# Patient Record
Sex: FEMALE | Race: WHITE | NOT HISPANIC OR LATINO | Employment: OTHER | URBAN - METROPOLITAN AREA
[De-identification: names, ages, dates, MRNs, and addresses within clinical notes are randomized per-mention and may not be internally consistent; named-entity substitution may affect disease eponyms.]

---

## 2017-05-16 ENCOUNTER — OFFICE VISIT (OUTPATIENT)
Dept: GASTROENTEROLOGY | Facility: CLINIC | Age: 57
End: 2017-05-16

## 2017-05-16 VITALS
SYSTOLIC BLOOD PRESSURE: 112 MMHG | HEIGHT: 62 IN | WEIGHT: 225 LBS | DIASTOLIC BLOOD PRESSURE: 76 MMHG | BODY MASS INDEX: 41.41 KG/M2

## 2017-05-16 DIAGNOSIS — K63.5 COLON POLYPS: ICD-10-CM

## 2017-05-16 DIAGNOSIS — E55.9 VITAMIN D DEFICIENCY: ICD-10-CM

## 2017-05-16 DIAGNOSIS — R11.0 NAUSEA: ICD-10-CM

## 2017-05-16 DIAGNOSIS — R19.4 CHANGE IN BOWEL HABITS: ICD-10-CM

## 2017-05-16 DIAGNOSIS — R10.84 GENERALIZED ABDOMINAL PAIN: Primary | ICD-10-CM

## 2017-05-16 PROCEDURE — 99213 OFFICE O/P EST LOW 20 MIN: CPT | Performed by: NURSE PRACTITIONER

## 2017-05-16 RX ORDER — LORAZEPAM 0.5 MG/1
0.5 TABLET ORAL
COMMUNITY
End: 2020-03-02 | Stop reason: ALTCHOICE

## 2017-05-16 RX ORDER — ERGOCALCIFEROL 1.25 MG/1
CAPSULE ORAL
Refills: 0 | COMMUNITY
Start: 2017-04-24 | End: 2017-07-10

## 2017-05-16 RX ORDER — SUCRALFATE 1 G/1
1 TABLET ORAL 4 TIMES DAILY
Qty: 120 TABLET | Refills: 1 | Status: SHIPPED | OUTPATIENT
Start: 2017-05-16 | End: 2017-07-10

## 2017-05-16 RX ORDER — LOSARTAN POTASSIUM 25 MG/1
TABLET ORAL
Refills: 3 | COMMUNITY
Start: 2017-05-02 | End: 2017-07-10

## 2017-05-16 RX ORDER — TRAZODONE HYDROCHLORIDE 50 MG/1
25 TABLET ORAL NIGHTLY
COMMUNITY
End: 2020-03-02 | Stop reason: ALTCHOICE

## 2017-05-16 RX ORDER — SERTRALINE HYDROCHLORIDE 25 MG/1
25 TABLET, FILM COATED ORAL NIGHTLY
COMMUNITY
End: 2019-06-07

## 2017-05-16 RX ORDER — METOPROLOL SUCCINATE 25 MG/1
TABLET, EXTENDED RELEASE ORAL
Refills: 1 | COMMUNITY
Start: 2017-05-02 | End: 2017-07-10

## 2017-05-16 RX ORDER — SODIUM CHLORIDE 0.9 % (FLUSH) 0.9 %
1-10 SYRINGE (ML) INJECTION AS NEEDED
Status: CANCELLED | OUTPATIENT
Start: 2017-05-16

## 2017-05-17 ENCOUNTER — TELEPHONE (OUTPATIENT)
Dept: GASTROENTEROLOGY | Facility: CLINIC | Age: 57
End: 2017-05-17

## 2017-05-17 LAB
25(OH)D3+25(OH)D2 SERPL-MCNC: 18.9 NG/ML (ref 30–100)
ALBUMIN SERPL-MCNC: 4.2 G/DL (ref 3.5–5.2)
ALBUMIN/GLOB SERPL: 1.4 G/DL
ALP SERPL-CCNC: 69 U/L (ref 39–117)
ALT SERPL-CCNC: 28 U/L (ref 1–33)
AMYLASE SERPL-CCNC: 49 U/L (ref 28–100)
AST SERPL-CCNC: 24 U/L (ref 1–32)
BASOPHILS # BLD AUTO: 0.02 10*3/MM3 (ref 0–0.2)
BASOPHILS NFR BLD AUTO: 0.2 % (ref 0–1.5)
BILIRUB SERPL-MCNC: 0.2 MG/DL (ref 0.1–1.2)
BUN SERPL-MCNC: 17 MG/DL (ref 6–20)
BUN/CREAT SERPL: 21.3 (ref 7–25)
CALCIUM SERPL-MCNC: 9.6 MG/DL (ref 8.6–10.5)
CHLORIDE SERPL-SCNC: 101 MMOL/L (ref 98–107)
CO2 SERPL-SCNC: 30.1 MMOL/L (ref 22–29)
CREAT SERPL-MCNC: 0.8 MG/DL (ref 0.57–1)
EOSINOPHIL # BLD AUTO: 0.3 10*3/MM3 (ref 0–0.7)
EOSINOPHIL NFR BLD AUTO: 3.7 % (ref 0.3–6.2)
ERYTHROCYTE [DISTWIDTH] IN BLOOD BY AUTOMATED COUNT: 15.9 % (ref 11.7–13)
GLOBULIN SER CALC-MCNC: 3 GM/DL
GLUCOSE SERPL-MCNC: 94 MG/DL (ref 65–99)
HCT VFR BLD AUTO: 37.6 % (ref 35.6–45.5)
HGB BLD-MCNC: 11.9 G/DL (ref 11.9–15.5)
IMM GRANULOCYTES # BLD: 0.02 10*3/MM3 (ref 0–0.03)
IMM GRANULOCYTES NFR BLD: 0.2 % (ref 0–0.5)
LIPASE SERPL-CCNC: 70 U/L (ref 13–60)
LYMPHOCYTES # BLD AUTO: 3.24 10*3/MM3 (ref 0.9–4.8)
LYMPHOCYTES NFR BLD AUTO: 40 % (ref 19.6–45.3)
MCH RBC QN AUTO: 30.1 PG (ref 26.9–32)
MCHC RBC AUTO-ENTMCNC: 31.6 G/DL (ref 32.4–36.3)
MCV RBC AUTO: 95.2 FL (ref 80.5–98.2)
MONOCYTES # BLD AUTO: 0.89 10*3/MM3 (ref 0.2–1.2)
MONOCYTES NFR BLD AUTO: 11 % (ref 5–12)
NEUTROPHILS # BLD AUTO: 3.63 10*3/MM3 (ref 1.9–8.1)
NEUTROPHILS NFR BLD AUTO: 44.9 % (ref 42.7–76)
PLATELET # BLD AUTO: 324 10*3/MM3 (ref 140–500)
POTASSIUM SERPL-SCNC: 4.5 MMOL/L (ref 3.5–5.2)
PROT SERPL-MCNC: 7.2 G/DL (ref 6–8.5)
RBC # BLD AUTO: 3.95 10*6/MM3 (ref 3.9–5.2)
SODIUM SERPL-SCNC: 142 MMOL/L (ref 136–145)
TSH SERPL DL<=0.005 MIU/L-ACNC: 2.65 MIU/ML (ref 0.27–4.2)
WBC # BLD AUTO: 8.1 10*3/MM3 (ref 4.5–10.7)

## 2017-05-25 ENCOUNTER — APPOINTMENT (OUTPATIENT)
Dept: CT IMAGING | Facility: HOSPITAL | Age: 57
End: 2017-05-25

## 2017-05-30 ENCOUNTER — APPOINTMENT (OUTPATIENT)
Dept: CT IMAGING | Facility: HOSPITAL | Age: 57
End: 2017-05-30

## 2017-07-10 RX ORDER — ERGOCALCIFEROL 1.25 MG/1
50000 CAPSULE ORAL
COMMUNITY

## 2017-07-10 RX ORDER — LOSARTAN POTASSIUM 25 MG/1
25 TABLET ORAL NIGHTLY
COMMUNITY

## 2017-07-11 ENCOUNTER — ANESTHESIA EVENT (OUTPATIENT)
Dept: GASTROENTEROLOGY | Facility: HOSPITAL | Age: 57
End: 2017-07-11

## 2017-07-11 ENCOUNTER — ANESTHESIA (OUTPATIENT)
Dept: GASTROENTEROLOGY | Facility: HOSPITAL | Age: 57
End: 2017-07-11

## 2017-07-11 ENCOUNTER — HOSPITAL ENCOUNTER (OUTPATIENT)
Facility: HOSPITAL | Age: 57
Setting detail: HOSPITAL OUTPATIENT SURGERY
Discharge: HOME | End: 2017-07-11
Attending: INTERNAL MEDICINE | Admitting: INTERNAL MEDICINE

## 2017-07-11 VITALS
DIASTOLIC BLOOD PRESSURE: 70 MMHG | SYSTOLIC BLOOD PRESSURE: 133 MMHG | BODY MASS INDEX: 40.72 KG/M2 | RESPIRATION RATE: 14 BRPM | HEIGHT: 62 IN | WEIGHT: 221.3 LBS | TEMPERATURE: 97.7 F | HEART RATE: 67 BPM | OXYGEN SATURATION: 97 %

## 2017-07-11 DIAGNOSIS — R11.0 NAUSEA: ICD-10-CM

## 2017-07-11 DIAGNOSIS — R19.4 CHANGE IN BOWEL HABITS: ICD-10-CM

## 2017-07-11 DIAGNOSIS — R10.84 GENERALIZED ABDOMINAL PAIN: ICD-10-CM

## 2017-07-11 LAB — GLUCOSE BLDC GLUCOMTR-MCNC: 83 MG/DL (ref 70–130)

## 2017-07-11 PROCEDURE — 88341 IMHCHEM/IMCYTCHM EA ADD ANTB: CPT

## 2017-07-11 PROCEDURE — 45385 COLONOSCOPY W/LESION REMOVAL: CPT | Performed by: INTERNAL MEDICINE

## 2017-07-11 PROCEDURE — 43239 EGD BIOPSY SINGLE/MULTIPLE: CPT | Performed by: INTERNAL MEDICINE

## 2017-07-11 PROCEDURE — 25010000002 PROPOFOL 10 MG/ML EMULSION: Performed by: NURSE ANESTHETIST, CERTIFIED REGISTERED

## 2017-07-11 PROCEDURE — 88342 IMHCHEM/IMCYTCHM 1ST ANTB: CPT

## 2017-07-11 PROCEDURE — 82962 GLUCOSE BLOOD TEST: CPT

## 2017-07-11 PROCEDURE — 45380 COLONOSCOPY AND BIOPSY: CPT | Performed by: INTERNAL MEDICINE

## 2017-07-11 PROCEDURE — 45381 COLONOSCOPY SUBMUCOUS NJX: CPT | Performed by: INTERNAL MEDICINE

## 2017-07-11 PROCEDURE — 88305 TISSUE EXAM BY PATHOLOGIST: CPT | Performed by: INTERNAL MEDICINE

## 2017-07-11 PROCEDURE — 88312 SPECIAL STAINS GROUP 1: CPT | Performed by: INTERNAL MEDICINE

## 2017-07-11 RX ORDER — PROMETHAZINE HYDROCHLORIDE 25 MG/1
25 TABLET ORAL ONCE AS NEEDED
Status: DISCONTINUED | OUTPATIENT
Start: 2017-07-11 | End: 2017-07-11 | Stop reason: HOSPADM

## 2017-07-11 RX ORDER — PROMETHAZINE HYDROCHLORIDE 25 MG/ML
12.5 INJECTION, SOLUTION INTRAMUSCULAR; INTRAVENOUS ONCE AS NEEDED
Status: DISCONTINUED | OUTPATIENT
Start: 2017-07-11 | End: 2017-07-11 | Stop reason: HOSPADM

## 2017-07-11 RX ORDER — SODIUM CHLORIDE 0.9 % (FLUSH) 0.9 %
1-10 SYRINGE (ML) INJECTION AS NEEDED
Status: DISCONTINUED | OUTPATIENT
Start: 2017-07-11 | End: 2017-07-11 | Stop reason: HOSPADM

## 2017-07-11 RX ORDER — PROMETHAZINE HYDROCHLORIDE 25 MG/1
25 SUPPOSITORY RECTAL ONCE AS NEEDED
Status: DISCONTINUED | OUTPATIENT
Start: 2017-07-11 | End: 2017-07-11 | Stop reason: HOSPADM

## 2017-07-11 RX ORDER — SODIUM CHLORIDE, SODIUM LACTATE, POTASSIUM CHLORIDE, CALCIUM CHLORIDE 600; 310; 30; 20 MG/100ML; MG/100ML; MG/100ML; MG/100ML
30 INJECTION, SOLUTION INTRAVENOUS CONTINUOUS PRN
Status: DISCONTINUED | OUTPATIENT
Start: 2017-07-11 | End: 2017-07-11 | Stop reason: HOSPADM

## 2017-07-11 RX ORDER — PROPOFOL 10 MG/ML
VIAL (ML) INTRAVENOUS CONTINUOUS PRN
Status: DISCONTINUED | OUTPATIENT
Start: 2017-07-11 | End: 2017-07-11 | Stop reason: SURG

## 2017-07-11 RX ORDER — PROPOFOL 10 MG/ML
VIAL (ML) INTRAVENOUS AS NEEDED
Status: DISCONTINUED | OUTPATIENT
Start: 2017-07-11 | End: 2017-07-11 | Stop reason: SURG

## 2017-07-11 RX ORDER — FLUTICASONE PROPIONATE 50 MCG
2 SPRAY, SUSPENSION (ML) NASAL DAILY
COMMUNITY

## 2017-07-11 RX ADMIN — PROPOFOL 200 MCG/KG/MIN: 10 INJECTION, EMULSION INTRAVENOUS at 14:27

## 2017-07-11 RX ADMIN — PROPOFOL 100 MG: 10 INJECTION, EMULSION INTRAVENOUS at 14:29

## 2017-07-11 RX ADMIN — SODIUM CHLORIDE, POTASSIUM CHLORIDE, SODIUM LACTATE AND CALCIUM CHLORIDE 30 ML/HR: 600; 310; 30; 20 INJECTION, SOLUTION INTRAVENOUS at 14:01

## 2017-07-11 NOTE — PLAN OF CARE
Problem: Patient Care Overview (Adult)  Goal: Plan of Care Review  Outcome: Ongoing (interventions implemented as appropriate)    07/11/17 1323   Coping/Psychosocial Response Interventions   Plan Of Care Reviewed With patient       Goal: Adult Individualization and Mutuality  Outcome: Ongoing (interventions implemented as appropriate)  Goal: Discharge Needs Assessment  Outcome: Ongoing (interventions implemented as appropriate)    Problem: GI Endoscopy (Adult)  Intervention: Monitor/Manage Procedure Recovery    07/11/17 1323   Respiratory Interventions   Airway/Ventilation Management airway patency maintained   Coping/Psychosocial Interventions   Environmental Support calm environment promoted   Activity   Activity Type activity adjusted per tolerance   Cardiac Interventions   Warming Thermoregulation Maintenance warm blankets applied       Intervention: Prevent Karin-procedural Injury    07/11/17 1323   Positioning   Positioning side lying, left   Head Of Bed (HOB) Position HOB elevated

## 2017-07-11 NOTE — ANESTHESIA PREPROCEDURE EVALUATION
Anesthesia Evaluation     Patient summary reviewed   NPO Solid Status: > 8 hours  NPO Liquid Status: > 6 hours     Airway   Mallampati: II  TM distance: >3 FB  Dental      Pulmonary    Cardiovascular     Rhythm: regular  Rate: normal    (+) hypertension,       Neuro/Psych  GI/Hepatic/Renal/Endo    (+) morbid obesity, GERD, diabetes mellitus,     Musculoskeletal     (+) myalgias,   Abdominal    Substance History      OB/GYN          Other      history of cancer remission                                    Anesthesia Plan    ASA 2     MAC     Anesthetic plan and risks discussed with patient.

## 2017-07-11 NOTE — H&P
Chief Complaint   Patient presents with   • Abdominal Pain   • Heartburn       and sore tongue         Sri Kellogg is a 56 y.o. female here for a follow up visit for Nausea, abdominal pain, changes in bowel habits      HPI     Patient of Dr. Betnley's seen for her first and only visit in our office in January 2016. At that time presented with complaints of upper abdominal discomfort that is been ongoing for several years, worse after eating. At that time complaining of intermittent nausea with no vomiting. Diarrhea averaging 6 bowel movements a day without rectal bleeding or melena or reports of weight loss. Labs were obtained, CT of the abdomen and pelvis was planned but not done, stool studies were not done. Labs were reviewed with normal findings. The patient was lost to follow-up until today.     Patient presents today complaining of ongoing chronic nausea that is intermittent but more persistent for the last 6 months to a year. This is not associated with any vomiting. She does not report that it is related to food intake. She can identify no specific triggers with regard to onset or worsening specifically changes and dietary or medication regimen. She does have a history of diabetes but reports it is fairly well controlled. She has had no formal evaluation with regard to endoscopy or gastric emptying studies. She takes no medications for the symptoms. In addition, she complains of intermittent burning in her throat that again is been worse over the last 6 months. She does notice that the burning is worse approximately 2-3 hours after food intake but the type of food does not worsen the severity of the symptoms. She also reports refluxing of food and to the back of her mouth when sleeping. She reports that she will often wake up choking or vomiting and that has been ongoing for a year. Previous medication trials have included Nexium, Prilosec and Prevacid over-the-counter that caused leg swelling. She has tried  Zantac and Pepcid previously that cause heart racing. She does admit eating late at night prior to going to bed. She does use a adjustable bed in which the head of her bed can be elevated but is noticed no change with bed adjustments     . The diarrhea that she was experiencing changed to primarily constipation since September 2016. She reports having a bowel movement every other day sometimes going every 3-4 days. If she goes at least 3-4 day she will use MiraLAX with fairly good results. This is associated with abdominal distention and primarily left-sided abdominal discomfort. She does report that the nausea gets worse when she is constipated. She has had prior colonoscopies that she reports were done approximately 3-4 years ago with findings of colon polyps that were benign to the best of her recollection. She did denies any family history of colon cancers or irritable bowel disease. Denies any blood in her stools.      Medical History         Past Medical History:   Diagnosis Date   • Cancer       skin   • Colon polyp     • Diabetes mellitus     • GERD (gastroesophageal reflux disease)     • Hypertension               Current Medications           Current Outpatient Prescriptions   Medication Sig Dispense Refill   • LORazepam (ATIVAN) 0.5 MG tablet Take 0.5 mg by mouth Every 8 (Eight) Hours As Needed for Anxiety.       • losartan (COZAAR) 25 MG tablet TK 1 T PO QD   3   • metFORMIN (GLUCOPHAGE) 500 MG tablet Take 500 mg by mouth 2 (Two) Times a Day With Meals.       • metoprolol succinate XL (TOPROL-XL) 25 MG 24 hr tablet TK 1 T PO QD   1   • sertraline (ZOLOFT) 25 MG tablet Take 25 mg by mouth Daily.       • traZODone (DESYREL) 25 MG half tablet Take 25 mg by mouth Every Night.       • vitamin D (ERGOCALCIFEROL) 01170 UNITS capsule capsule TK ONE C PO Q 2 WEEKS   0   • sucralfate (CARAFATE) 1 G tablet Take 1 tablet by mouth 4 (Four) Times a Day. 120 tablet 1      No current facility-administered medications  "for this visit.             PRN Meds:.          Allergies   Allergen Reactions   • Lisinopril Cough   • Penicillins     • Codeine Palpitations and GI Intolerance          Social History    Social History            Social History   • Marital status: Single       Spouse name: N/A   • Number of children: N/A   • Years of education: N/A          Occupational History   • Not on file.           Social History Main Topics   • Smoking status: Former Smoker   • Smokeless tobacco: Not on file   • Alcohol use No   • Drug use: No   • Sexual activity: Not on file           Other Topics Concern   • Not on file      Social History Narrative                  Family History   Problem Relation Age of Onset   • TRAE disease Mother     • Colon polyps Mother           Review of Systems   Constitutional: Negative for activity change, appetite change and unexpected weight change.   HENT: Negative for trouble swallowing.   Respiratory: Positive for choking.   Cardiovascular: Negative for chest pain.   Gastrointestinal: Positive for abdominal pain, constipation, nausea and vomiting. Negative for abdominal distention and blood in stool.   Allergic/Immunologic: Negative for immunocompromised state.             Vitals:     05/16/17 1438   BP: 112/76      /76  Ht 62\" (157.5 cm)  Wt 225 lb (102 kg)  BMI 41.15 kg/m2  Physical Exam   Constitutional: She is oriented to person, place, and time. She appears well-developed and well-nourished.   Obese   HENT:   Head: Normocephalic and atraumatic.   Mouth/Throat: Oropharynx is clear and moist.   Eyes: No scleral icterus.   Neck: No thyromegaly present.   Cardiovascular: Normal rate and regular rhythm.   Pulmonary/Chest: Effort normal and breath sounds normal.   Abdominal: Soft. Bowel sounds are normal. She exhibits no distension. There is no tenderness. There is no rebound and no guarding.   Obese, round, no pain to palpation   Neurological: She is alert and oriented to person, place, and time. "   Skin: Skin is warm and dry.   Psychiatric: She has a normal mood and affect.   Vitals reviewed.        ASSESSMENT AND PLAN     Sri was seen today for abdominal pain and heartburn.     Diagnoses and all orders for this visit:     Generalized abdominal pain  Comments:  Obtain CT with pancreatic protocol labs today  Orders:  - CT Abdomen Pelvis With Contrast; Future  - CBC & Differential  - Comprehensive Metabolic Panel  - Amylase  - Lipase  - TSH  - Case Request; Standing  - sodium chloride 0.9 % flush 1-10 mL; Infuse 1-10 mL into a venous catheter As Needed for Line Care.  - Case Request     Change in bowel habits  Comments:  And colonoscopy for further evaluation  Orders:  - TSH  - Case Request; Standing  - sodium chloride 0.9 % flush 1-10 mL; Infuse 1-10 mL into a venous catheter As Needed for Line Care.  - Case Request     Nausea  Comments:  In EGD for further evaluation, start Carafate  Orders:  - Vitamin D 25 Hydroxy  - TSH  - Case Request; Standing  - sodium chloride 0.9 % flush 1-10 mL; Infuse 1-10 mL into a venous catheter As Needed for Line Care.  - Case Request     Vitamin D deficiency   - Vitamin D 25 Hydroxy     Colon polyps  Comments:  Last colonoscopy 3-4 years ago. Subjective history of polyps with benign pathology. Follow-up colonoscopy be scheduled     Other orders  - Obtain informed consent; Standing  - Verify bowel prep was successful; Standing  - Give tap water enema if bowel prep was insufficient; Standing  - Give Fleet's enema for intolerance of bowel prep; Standing  - Insert Peripheral IV; Standing  - Saline Lock & Maintain IV Access; Standing  - Verify Informed Consent; Standing  - sucralfate (CARAFATE) 1 G tablet; Take 1 tablet by mouth 4 (Four) Times a Day.        Labs will be obtained as above and patient will be notified of test results. In addition, CT of the abdomen and pelvis with pancreatic protocol for upper abdominal discomfort will be performed and patient will be notified of  test results. We will schedule her for both EGD and colonoscopy for evaluation of now fairly chronic nausea, burning in her throat, reflux and alterations in bowel habits with constipation. She can continue MiraLAX over-the-counter as needed. Start Carafate for complaints of nausea and burning in her throat. Previous trials of PPIs to include Nexium, Prilosec and Prevacid have caused leg swelling. She had reactions with Zantac and Pepcid with complaints of heart racing. Further treatment plan pending lab review, imaging and endoscopy findings.     7/11/17 - No change from the above H and P. Saud Bentley M.D.

## 2017-07-11 NOTE — DISCHARGE INSTRUCTIONS
For the next 24 hours patient needs to be with a responsible adult.    For 24 hours DO NOT drive, operate machinery, appliances, drink alcohol, make important decisions or sign legal documents.    Start with a light or bland diet and advance to regular diet as tolerated.    Follow recommendations on procedure report provided by your doctor.    Call Dr. Bentley for problems [phone]    Problems may include but not limited to: large amounts of bleeding, trouble breathing, repeated vomiting, severe unrelieved pain, fever or chills.

## 2017-07-11 NOTE — ANESTHESIA POSTPROCEDURE EVALUATION
Patient: Sri Valdez    Procedure Summary     Date Anesthesia Start Anesthesia Stop Room / Location    07/11/17 1419 1519  [facility]       Procedure Diagnosis Surgeon Provider    ESOPHAGOGASTRODUODENOSCOPY with biopsies (N/A Esophagus); COLONOSCOPY to cecum with random biopsies, and hot snare and cold polypectomies (N/A ) Nausea; Generalized abdominal pain; Change in bowel habits  (Nausea [R11.0]; Generalized abdominal pain [R10.84]; Change in bowel habits [R19.4]) MD Katie Watkins MD          Anesthesia Type: MAC  Last vitals  /74 (07/11/17 1518)    Temp      Pulse 96 (07/11/17 1518)   Resp 16 (07/11/17 1518)    SpO2 93 % (07/11/17 1518)      Post Anesthesia Care and Evaluation    Patient location during evaluation: PACU  Patient participation: complete - patient participated  Level of consciousness: awake and alert  Pain management: adequate  Airway patency: patent  Anesthetic complications: No anesthetic complications    Cardiovascular status: acceptable  Respiratory status: acceptable  Hydration status: acceptable

## 2017-07-18 LAB
CYTO UR: NORMAL
DX PRELIMINARY: NORMAL
LAB AP CASE REPORT: NORMAL
LAB AP INTRADEPARTMENTAL CONSULT: NORMAL
Lab: NORMAL
PATH REPORT.ADDENDUM SPEC: NORMAL
PATH REPORT.FINAL DX SPEC: NORMAL
PATH REPORT.GROSS SPEC: NORMAL

## 2017-07-18 NOTE — PROGRESS NOTES
Tell her that path from the EGD looked good. The colon polyps that were removed were not cancerous but some were precancerous. I recommend a repeat c/s in 3-5 yrs. ceferino

## 2017-07-24 ENCOUNTER — TELEPHONE (OUTPATIENT)
Dept: GASTROENTEROLOGY | Facility: CLINIC | Age: 57
End: 2017-07-24

## 2017-07-24 ENCOUNTER — OFFICE VISIT (OUTPATIENT)
Dept: GASTROENTEROLOGY | Facility: CLINIC | Age: 57
End: 2017-07-24

## 2017-07-24 ENCOUNTER — HOSPITAL ENCOUNTER (OUTPATIENT)
Dept: CT IMAGING | Facility: HOSPITAL | Age: 57
Discharge: HOME | End: 2017-07-24
Attending: INTERNAL MEDICINE | Admitting: INTERNAL MEDICINE

## 2017-07-24 VITALS
HEIGHT: 62 IN | SYSTOLIC BLOOD PRESSURE: 116 MMHG | BODY MASS INDEX: 41.22 KG/M2 | DIASTOLIC BLOOD PRESSURE: 70 MMHG | TEMPERATURE: 98.1 F | WEIGHT: 224 LBS

## 2017-07-24 DIAGNOSIS — K63.5 COLON POLYPS: ICD-10-CM

## 2017-07-24 DIAGNOSIS — R10.11 RIGHT UPPER QUADRANT ABDOMINAL PAIN: Primary | ICD-10-CM

## 2017-07-24 PROCEDURE — 74177 CT ABD & PELVIS W/CONTRAST: CPT

## 2017-07-24 PROCEDURE — 82565 ASSAY OF CREATININE: CPT

## 2017-07-24 PROCEDURE — 0 IOPAMIDOL 61 % SOLUTION: Performed by: INTERNAL MEDICINE

## 2017-07-24 PROCEDURE — 0 DIATRIZOATE MEGLUMINE & SODIUM PER 1 ML: Performed by: INTERNAL MEDICINE

## 2017-07-24 PROCEDURE — 99213 OFFICE O/P EST LOW 20 MIN: CPT | Performed by: INTERNAL MEDICINE

## 2017-07-24 RX ORDER — PANTOPRAZOLE SODIUM 40 MG/1
TABLET, DELAYED RELEASE ORAL AS NEEDED
Refills: 11 | COMMUNITY
Start: 2017-07-13 | End: 2022-03-03 | Stop reason: ALTCHOICE

## 2017-07-24 RX ADMIN — DIATRIZOATE MEGLUMINE AND DIATRIZOATE SODIUM 30 ML: 660; 100 LIQUID ORAL; RECTAL at 11:50

## 2017-07-24 RX ADMIN — IOPAMIDOL 85 ML: 612 INJECTION, SOLUTION INTRAVENOUS at 13:21

## 2017-07-24 NOTE — TELEPHONE ENCOUNTER
Received from call from Dr Barahona that the pt's states ct of abd and pelvis showed no free air or abcess.  He states it does show a fatty liver.  He states [facility] is holding the pt until they hear back from us.  Advised him we would call them and tell them the pt can be dc'd.      Called [facility] at [phone] and spoke with Darshana who advised she had dc'd the pt.  Advised would update Dr Bentley.

## 2017-07-24 NOTE — PROGRESS NOTES
"Chief Complaint   Patient presents with   • Follow-up     [place]       History of Present Illness: We reviewed the results of the EGD and colonoscopy done earlier this month. She has not had the CT abd/pelvis because she is raising her neice's daughter and she is having surgery for a Chiari malformation @ 5 yo. Every time she takes a PPI her heart feels like it \"is rolling\". She feels like gas is refluxing into the esophagus and it is stuck. She feels discomfort in the right flank and it is worse after taking Miralax. She took some Ceftin that she had and it helped a little. She still has this right flank pain that is worse with walking or when on her feet. +nausea but no vomiting. NO fevers, chills. +dysuria. No urinary frequency.     Past Medical History:   Diagnosis Date   • Anxiety and depression    • Cancer     skin   • Colon polyp    • Diabetes mellitus    • Dry skin    • Fibromyalgia    • GERD (gastroesophageal reflux disease)    • History of lupus    • Hypertension    • PVC's (premature ventricular contractions)    • SOB (shortness of breath)        Past Surgical History:   Procedure Laterality Date   • BREAST LUMPECTOMY     • CHOLECYSTECTOMY     • COLONOSCOPY  2014   • COLONOSCOPY N/A 7/11/2017    Procedure: COLONOSCOPY to cecum with random biopsies, and hot snare and cold polypectomies;  Surgeon: Saud Bentley MD;  Location: [facility] END[facility]COPY;  Service:    • ENDOSCOPY N/A 7/11/2017    Procedure: ESOPHAGOGASTRODUODENOSCOPY with biopsies;  Surgeon: Saud Bentley MD;  Location: [facility] ENDOSCOPY;  Service:    • HYSTERECTOMY     • SINUS SURGERY     • UPPER GASTROINTESTINAL ENDOSCOPY  07/11/2017    LA Grade A reflux esophagitis, Erythematous mucosa in the stomach, Multiple gastric polyps         Current Outpatient Prescriptions:   •  fluticasone (FLONASE) 50 MCG/ACT nasal spray, 2 sprays into each nostril Daily., Disp: , Rfl:   •  LORazepam (ATIVAN) 0.5 MG tablet, Take 0.5 mg by mouth Every 8 (Eight) Hours As " Needed for Anxiety., Disp: , Rfl:   •  losartan (COZAAR) 25 MG tablet, Take 25 mg by mouth Every Night., Disp: , Rfl:   •  metFORMIN (GLUCOPHAGE) 500 MG tablet, Take 500 mg by mouth 2 (Two) Times a Day With Meals., Disp: , Rfl:   •  metoprolol tartrate (LOPRESSOR) 25 MG tablet, Take 25 mg by mouth Every Night., Disp: , Rfl:   •  pantoprazole (PROTONIX) 40 MG EC tablet, TK 1 T PO QD, Disp: , Rfl: 11  •  sertraline (ZOLOFT) 25 MG tablet, Take 25 mg by mouth Every Night., Disp: , Rfl:   •  traZODone (DESYREL) 25 MG half tablet, Take 25 mg by mouth Every Night., Disp: , Rfl:   •  vitamin D (ERGOCALCIFEROL) 97801 UNITS capsule capsule, Take 50,000 Units by mouth Every 14 (Fourteen) Days., Disp: , Rfl:     Allergies   Allergen Reactions   • Codeine Palpitations, GI Intolerance and Shortness Of Breath   • Penicillins Arrhythmia and Shortness Of Breath     RAPID HEARTRATE   • Demerol [Meperidine] Hives   • Dust Mite Extract Other (See Comments)     SNEEZING, SOA   • Levofloxacin Other (See Comments)     RIGID JAW   • Lisinopril Cough   • Meperidine Hcl Hives   • Adhesive Tape Rash       Family History   Problem Relation Age of Onset   • TRAE disease Mother    • Colon polyps Mother    • Malig Hyperthermia Neg Hx        Social History     Social History   • Marital status: Single     Spouse name: N/A   • Number of children: N/A   • Years of education: N/A     Occupational History   • Not on file.     Social History Main Topics   • Smoking status: Former Smoker     Packs/day: 2.00     Types: Cigarettes   • Smokeless tobacco: Never Used      Comment: JIMMY 20 YRS AGO   • Alcohol use No   • Drug use: No   • Sexual activity: Defer     Other Topics Concern   • Not on file     Social History Narrative       Review of Systems   Gastrointestinal: Positive for abdominal pain.   All other systems reviewed and are negative.      Vitals:    07/24/17 0958   BP: 116/70   Temp: 98.1 °F (36.7 °C)       Physical Exam   Constitutional: She is  oriented to person, place, and time. She appears well-developed and well-nourished. No distress.   HENT:   Head: Normocephalic and atraumatic. Hair is normal.   Right Ear: Hearing, tympanic membrane, external ear and ear canal normal. No drainage. No decreased hearing is noted.   Left Ear: Hearing, tympanic membrane, external ear and ear canal normal. No decreased hearing is noted.   Nose: No nasal deformity.   Mouth/Throat: Oropharynx is clear and moist.   Eyes: Conjunctivae, EOM and lids are normal. Pupils are equal, round, and reactive to light. Right eye exhibits no discharge. Left eye exhibits no discharge.   Neck: Normal range of motion. Neck supple. No JVD present. No tracheal deviation present. No thyromegaly present.   Cardiovascular: Normal rate, regular rhythm, normal heart sounds, intact distal pulses and normal pulses.  Exam reveals no gallop and no friction rub.    No murmur heard.  Pulmonary/Chest: Effort normal and breath sounds normal. No respiratory distress. She has no wheezes. She has no rales. She exhibits no tenderness.   Abdominal: Soft. Bowel sounds are normal. She exhibits no distension and no mass. There is tenderness. There is no rebound and no guarding. No hernia.   MIld RUQ tenderness. The skin looks normal in that area.    Musculoskeletal: Normal range of motion. She exhibits no edema, tenderness or deformity.   Lymphadenopathy:     She has no cervical adenopathy.   Neurological: She is alert and oriented to person, place, and time. She has normal reflexes. She displays normal reflexes. No cranial nerve deficit. She exhibits normal muscle tone. Coordination normal.   Skin: Skin is warm and dry. No rash noted. She is not diaphoretic. No erythema.   Psychiatric: She has a normal mood and affect. Her behavior is normal. Judgment and thought content normal.   Vitals reviewed.      Sri was seen today for follow-up.    Diagnoses and all orders for this visit:    Right upper quadrant  abdominal pain  -     Amylase  -     CBC & Differential  -     Comprehensive Metabolic Panel  -     Lipase  -     Urinalysis With / Culture If Indicated  -     CT Abdomen Pelvis With Contrast    Colon polyps       Assessment:  1) History of colonic adenomas.  2) Right flank pain and RUQ abdominal pain.    Recommendations:  1)  Colonoscopy in 3-5 years.  2)  Labs: UA, CMP, CBC, amylase, lipase.  3) CT scan of the abd/pelvis today !! I want her to call me tomorrow for results.   4) Continue CArafate. She cannot tolerated H2 receptor antagonists or PPI's  5) Lose weight !!      No Follow-up on file.

## 2017-07-24 NOTE — TELEPHONE ENCOUNTER
I called her with the results of her CT abd/pelvis. She has been seeing a group of doctors about her low back pain. ceferino

## 2017-07-25 ENCOUNTER — TELEPHONE (OUTPATIENT)
Dept: GASTROENTEROLOGY | Facility: CLINIC | Age: 57
End: 2017-07-25

## 2017-07-25 LAB
ALBUMIN SERPL-MCNC: 4.4 G/DL (ref 3.5–5.2)
ALBUMIN/GLOB SERPL: 1.6 G/DL
ALP SERPL-CCNC: 69 U/L (ref 39–117)
ALT SERPL-CCNC: 26 U/L (ref 1–33)
AMYLASE SERPL-CCNC: 57 U/L (ref 28–100)
APPEARANCE UR: CLEAR
AST SERPL-CCNC: 16 U/L (ref 1–32)
BACTERIA #/AREA URNS HPF: ABNORMAL /HPF
BASOPHILS # BLD AUTO: 0.03 10*3/MM3 (ref 0–0.2)
BASOPHILS NFR BLD AUTO: 0.3 % (ref 0–1.5)
BILIRUB SERPL-MCNC: 0.2 MG/DL (ref 0.1–1.2)
BILIRUB UR QL STRIP: NEGATIVE
BUN SERPL-MCNC: 11 MG/DL (ref 6–20)
BUN/CREAT SERPL: 14.5 (ref 7–25)
CALCIUM SERPL-MCNC: 9.7 MG/DL (ref 8.6–10.5)
CHLORIDE SERPL-SCNC: 100 MMOL/L (ref 98–107)
CO2 SERPL-SCNC: 28.2 MMOL/L (ref 22–29)
COLOR UR: YELLOW
CREAT BLDA-MCNC: 0.8 MG/DL (ref 0.6–1.3)
CREAT SERPL-MCNC: 0.76 MG/DL (ref 0.57–1)
CRYSTALS URNS MICRO: ABNORMAL
EOSINOPHIL # BLD AUTO: 0.27 10*3/MM3 (ref 0–0.7)
EOSINOPHIL NFR BLD AUTO: 2.8 % (ref 0.3–6.2)
EPI CELLS #/AREA URNS HPF: ABNORMAL /HPF
ERYTHROCYTE [DISTWIDTH] IN BLOOD BY AUTOMATED COUNT: 15 % (ref 11.7–13)
GLOBULIN SER CALC-MCNC: 2.8 GM/DL
GLUCOSE SERPL-MCNC: 114 MG/DL (ref 65–99)
GLUCOSE UR QL: NEGATIVE
HCT VFR BLD AUTO: 40.5 % (ref 35.6–45.5)
HGB BLD-MCNC: 12.9 G/DL (ref 11.9–15.5)
HGB UR QL STRIP: NEGATIVE
IMM GRANULOCYTES # BLD: 0 10*3/MM3 (ref 0–0.03)
IMM GRANULOCYTES NFR BLD: 0 % (ref 0–0.5)
KETONES UR QL STRIP: NEGATIVE
LEUKOCYTE ESTERASE UR QL STRIP: NEGATIVE
LIPASE SERPL-CCNC: 113 U/L (ref 13–60)
LYMPHOCYTES # BLD AUTO: 3.62 10*3/MM3 (ref 0.9–4.8)
LYMPHOCYTES NFR BLD AUTO: 37.1 % (ref 19.6–45.3)
MCH RBC QN AUTO: 30.4 PG (ref 26.9–32)
MCHC RBC AUTO-ENTMCNC: 31.9 G/DL (ref 32.4–36.3)
MCV RBC AUTO: 95.5 FL (ref 80.5–98.2)
MICRO URNS: NORMAL
MICRO URNS: NORMAL
MONOCYTES # BLD AUTO: 0.99 10*3/MM3 (ref 0.2–1.2)
MONOCYTES NFR BLD AUTO: 10.1 % (ref 5–12)
MUCOUS THREADS URNS QL MICRO: PRESENT /HPF
NEUTROPHILS # BLD AUTO: 4.85 10*3/MM3 (ref 1.9–8.1)
NEUTROPHILS NFR BLD AUTO: 49.7 % (ref 42.7–76)
NITRITE UR QL STRIP: NEGATIVE
PH UR STRIP: 5.5 [PH] (ref 5–7.5)
PLATELET # BLD AUTO: 318 10*3/MM3 (ref 140–500)
POTASSIUM SERPL-SCNC: 4.8 MMOL/L (ref 3.5–5.2)
PROT SERPL-MCNC: 7.2 G/DL (ref 6–8.5)
PROT UR QL STRIP: NEGATIVE
RBC # BLD AUTO: 4.24 10*6/MM3 (ref 3.9–5.2)
RBC #/AREA URNS HPF: ABNORMAL /HPF
SODIUM SERPL-SCNC: 141 MMOL/L (ref 136–145)
SP GR UR: 1.02 (ref 1–1.03)
UNIDENT CRYS URNS QL MICRO: PRESENT /LPF
URINALYSIS REFLEX: NORMAL
UROBILINOGEN UR STRIP-MCNC: 0.2 MG/DL (ref 0.2–1)
WBC # BLD AUTO: 9.76 10*3/MM3 (ref 4.5–10.7)
WBC #/AREA URNS HPF: ABNORMAL /HPF

## 2017-07-25 NOTE — TELEPHONE ENCOUNTER
I went over results of her labs and CT abd/pelvis (again). Her pain is gone (now that I am on Ceftin). I told her to f/u with me in 3 mos. ceferino    SCHEDULING - Please schedule her to see me in the office in 3 mos. ceferino

## 2017-07-25 NOTE — TELEPHONE ENCOUNTER
----- Message from Saud Bentley MD sent at 7/17/2017  8:31 PM EDT -----  Tell her that path from the EGD looked good. The colon polyps that were removed were not cancerous but some were precancerous. I recommend a repeat c/s in 3-5 yrs. jt

## 2019-04-01 ENCOUNTER — HOSPITAL ENCOUNTER (EMERGENCY)
Facility: HOSPITAL | Age: 59
Discharge: HOME | End: 2019-04-01
Attending: EMERGENCY MEDICINE | Admitting: EMERGENCY MEDICINE

## 2019-04-01 ENCOUNTER — APPOINTMENT (OUTPATIENT)
Dept: CT IMAGING | Facility: HOSPITAL | Age: 59
End: 2019-04-01

## 2019-04-01 ENCOUNTER — APPOINTMENT (OUTPATIENT)
Dept: GENERAL RADIOLOGY | Facility: HOSPITAL | Age: 59
End: 2019-04-01

## 2019-04-01 VITALS
RESPIRATION RATE: 16 BRPM | DIASTOLIC BLOOD PRESSURE: 75 MMHG | OXYGEN SATURATION: 98 % | HEIGHT: 62 IN | WEIGHT: 250 LBS | TEMPERATURE: 99.1 F | BODY MASS INDEX: 46.01 KG/M2 | SYSTOLIC BLOOD PRESSURE: 162 MMHG | HEART RATE: 81 BPM

## 2019-04-01 DIAGNOSIS — R00.2 PALPITATIONS: ICD-10-CM

## 2019-04-01 DIAGNOSIS — E86.0 DEHYDRATION: ICD-10-CM

## 2019-04-01 DIAGNOSIS — K52.9 ENTERITIS: Primary | ICD-10-CM

## 2019-04-01 LAB
ALBUMIN SERPL-MCNC: 3.9 G/DL (ref 3.5–5.2)
ALBUMIN/GLOB SERPL: 1.1 G/DL
ALP SERPL-CCNC: 72 U/L (ref 39–117)
ALT SERPL W P-5'-P-CCNC: 27 U/L (ref 1–33)
ANION GAP SERPL CALCULATED.3IONS-SCNC: 12.4 MMOL/L
AST SERPL-CCNC: 25 U/L (ref 1–32)
BASOPHILS # BLD AUTO: 0.02 10*3/MM3 (ref 0–0.2)
BASOPHILS NFR BLD AUTO: 0.3 % (ref 0–1.5)
BILIRUB SERPL-MCNC: 0.3 MG/DL (ref 0.2–1.2)
BUN BLD-MCNC: 10 MG/DL (ref 6–20)
BUN/CREAT SERPL: 12 (ref 7–25)
CALCIUM SPEC-SCNC: 8.7 MG/DL (ref 8.6–10.5)
CHLORIDE SERPL-SCNC: 102 MMOL/L (ref 98–107)
CO2 SERPL-SCNC: 26.6 MMOL/L (ref 22–29)
CREAT BLD-MCNC: 0.83 MG/DL (ref 0.57–1)
D DIMER PPP FEU-MCNC: 0.31 MCGFEU/ML (ref 0–0.49)
D-LACTATE SERPL-SCNC: 2.6 MMOL/L (ref 0.5–2)
DEPRECATED RDW RBC AUTO: 55.4 FL (ref 37–54)
EOSINOPHIL # BLD AUTO: 0.22 10*3/MM3 (ref 0–0.4)
EOSINOPHIL NFR BLD AUTO: 2.8 % (ref 0.3–6.2)
ERYTHROCYTE [DISTWIDTH] IN BLOOD BY AUTOMATED COUNT: 15.9 % (ref 12.3–15.4)
GFR SERPL CREATININE-BSD FRML MDRD: 70 ML/MIN/1.73
GLOBULIN UR ELPH-MCNC: 3.5 GM/DL
GLUCOSE BLD-MCNC: 152 MG/DL (ref 65–99)
HCT VFR BLD AUTO: 38.9 % (ref 34–46.6)
HGB BLD-MCNC: 12.1 G/DL (ref 12–15.9)
HOLD SPECIMEN: NORMAL
HOLD SPECIMEN: NORMAL
IMM GRANULOCYTES # BLD AUTO: 0.04 10*3/MM3 (ref 0–0.05)
IMM GRANULOCYTES NFR BLD AUTO: 0.5 % (ref 0–0.5)
LIPASE SERPL-CCNC: 43 U/L (ref 13–60)
LYMPHOCYTES # BLD AUTO: 1.37 10*3/MM3 (ref 0.7–3.1)
LYMPHOCYTES NFR BLD AUTO: 17.3 % (ref 19.6–45.3)
MAGNESIUM SERPL-MCNC: 2.1 MG/DL (ref 1.6–2.6)
MCH RBC QN AUTO: 29.7 PG (ref 26.6–33)
MCHC RBC AUTO-ENTMCNC: 31.1 G/DL (ref 31.5–35.7)
MCV RBC AUTO: 95.3 FL (ref 79–97)
MONOCYTES # BLD AUTO: 0.48 10*3/MM3 (ref 0.1–0.9)
MONOCYTES NFR BLD AUTO: 6.1 % (ref 5–12)
NEUTROPHILS # BLD AUTO: 5.77 10*3/MM3 (ref 1.4–7)
NEUTROPHILS NFR BLD AUTO: 73 % (ref 42.7–76)
NRBC BLD AUTO-RTO: 0 /100 WBC (ref 0–0)
PLATELET # BLD AUTO: 306 10*3/MM3 (ref 140–450)
PMV BLD AUTO: 9.4 FL (ref 6–12)
POTASSIUM BLD-SCNC: 3.8 MMOL/L (ref 3.5–5.2)
PROCALCITONIN SERPL-MCNC: 0.07 NG/ML (ref 0.1–0.25)
PROT SERPL-MCNC: 7.4 G/DL (ref 6–8.5)
RBC # BLD AUTO: 4.08 10*6/MM3 (ref 3.77–5.28)
SODIUM BLD-SCNC: 141 MMOL/L (ref 136–145)
TROPONIN T SERPL-MCNC: <0.01 NG/ML (ref 0–0.03)
WBC NRBC COR # BLD: 7.9 10*3/MM3 (ref 3.4–10.8)

## 2019-04-01 PROCEDURE — 96361 HYDRATE IV INFUSION ADD-ON: CPT

## 2019-04-01 PROCEDURE — 93005 ELECTROCARDIOGRAM TRACING: CPT | Performed by: EMERGENCY MEDICINE

## 2019-04-01 PROCEDURE — 85025 COMPLETE CBC W/AUTO DIFF WBC: CPT | Performed by: EMERGENCY MEDICINE

## 2019-04-01 PROCEDURE — 80053 COMPREHEN METABOLIC PANEL: CPT | Performed by: EMERGENCY MEDICINE

## 2019-04-01 PROCEDURE — 99283 EMERGENCY DEPT VISIT LOW MDM: CPT

## 2019-04-01 PROCEDURE — 83690 ASSAY OF LIPASE: CPT | Performed by: EMERGENCY MEDICINE

## 2019-04-01 PROCEDURE — 93010 ELECTROCARDIOGRAM REPORT: CPT | Performed by: INTERNAL MEDICINE

## 2019-04-01 PROCEDURE — 84484 ASSAY OF TROPONIN QUANT: CPT | Performed by: EMERGENCY MEDICINE

## 2019-04-01 PROCEDURE — 25010000002 IOPAMIDOL 61 % SOLUTION: Performed by: EMERGENCY MEDICINE

## 2019-04-01 PROCEDURE — 74177 CT ABD & PELVIS W/CONTRAST: CPT

## 2019-04-01 PROCEDURE — 25010000002 ONDANSETRON PER 1 MG: Performed by: EMERGENCY MEDICINE

## 2019-04-01 PROCEDURE — 71045 X-RAY EXAM CHEST 1 VIEW: CPT

## 2019-04-01 PROCEDURE — 84145 PROCALCITONIN (PCT): CPT | Performed by: EMERGENCY MEDICINE

## 2019-04-01 PROCEDURE — 96375 TX/PRO/DX INJ NEW DRUG ADDON: CPT

## 2019-04-01 PROCEDURE — 96374 THER/PROPH/DIAG INJ IV PUSH: CPT

## 2019-04-01 PROCEDURE — 83735 ASSAY OF MAGNESIUM: CPT | Performed by: EMERGENCY MEDICINE

## 2019-04-01 PROCEDURE — 83605 ASSAY OF LACTIC ACID: CPT | Performed by: EMERGENCY MEDICINE

## 2019-04-01 PROCEDURE — 85379 FIBRIN DEGRADATION QUANT: CPT | Performed by: EMERGENCY MEDICINE

## 2019-04-01 RX ORDER — ONDANSETRON 4 MG/1
4 TABLET, FILM COATED ORAL EVERY 6 HOURS
Qty: 12 TABLET | Refills: 0 | Status: SHIPPED | OUTPATIENT
Start: 2019-04-01 | End: 2019-06-07

## 2019-04-01 RX ORDER — ONDANSETRON 2 MG/ML
4 INJECTION INTRAMUSCULAR; INTRAVENOUS ONCE
Status: COMPLETED | OUTPATIENT
Start: 2019-04-01 | End: 2019-04-01

## 2019-04-01 RX ORDER — DICYCLOMINE HYDROCHLORIDE 10 MG/1
10 CAPSULE ORAL 4 TIMES DAILY PRN
Qty: 20 CAPSULE | Refills: 0 | Status: SHIPPED | OUTPATIENT
Start: 2019-04-01 | End: 2019-08-28 | Stop reason: ALTCHOICE

## 2019-04-01 RX ORDER — FAMOTIDINE 10 MG/ML
20 INJECTION, SOLUTION INTRAVENOUS ONCE
Status: COMPLETED | OUTPATIENT
Start: 2019-04-01 | End: 2019-04-01

## 2019-04-01 RX ADMIN — IOPAMIDOL 85 ML: 612 INJECTION, SOLUTION INTRAVENOUS at 15:46

## 2019-04-01 RX ADMIN — SODIUM CHLORIDE 1000 ML: 9 INJECTION, SOLUTION INTRAVENOUS at 13:29

## 2019-04-01 RX ADMIN — ONDANSETRON 4 MG: 2 INJECTION INTRAMUSCULAR; INTRAVENOUS at 13:29

## 2019-04-01 RX ADMIN — FAMOTIDINE 20 MG: 10 INJECTION INTRAVENOUS at 13:29

## 2019-04-01 NOTE — ED PROVIDER NOTES
EMERGENCY DEPARTMENT ENCOUNTER    CHIEF COMPLAINT  Chief Complaint: palpitations  History given by: patient  History limited by: nothing  Room Number: 02/02  PMD: Claudio Small MD      HPI:  Pt is a 58 y.o. female who presents complaining of palpitations and SOA that began this morning at 0400. She states that it feels like her heart is racing. She also had multiple episodes of diarrhea since last night. Pt states that she is still experiencing palpitations but her SOA has improved. She also complains of mild epigastric pain, abdominal distension, and nausea. Pt has no other complaints at this time.     Duration:  8 hours  Onset: gradual  Timing: constant  Location: n/a  Radiation: n/a  Quality: heart racing  Intensity/Severity: moderate  Progression: unchanged  Associated Symptoms: SOA, diarrhea, abdominal pain, abdominal distension, nausea,   Aggravating Factors: none  Alleviating Factors: none  Previous Episodes: none  Treatment before arrival: none    PAST MEDICAL HISTORY  Active Ambulatory Problems     Diagnosis Date Noted   • Vitamin D deficiency 05/16/2017   • Colon polyps 05/16/2017     Resolved Ambulatory Problems     Diagnosis Date Noted   • No Resolved Ambulatory Problems     Past Medical History:   Diagnosis Date   • Anxiety and depression    • Cancer (CMS/HCC)    • Colon polyp    • Diabetes mellitus (CMS/HCC)    • Dry skin    • Fibromyalgia    • GERD (gastroesophageal reflux disease)    • History of lupus    • Hypertension    • PVC's (premature ventricular contractions)    • SOB (shortness of breath)        PAST SURGICAL HISTORY  Past Surgical History:   Procedure Laterality Date   • BREAST LUMPECTOMY     • CHOLECYSTECTOMY     • COLONOSCOPY  2014   • COLONOSCOPY N/A 7/11/2017    Procedure: COLONOSCOPY to cecum with random biopsies, and hot snare and cold polypectomies;  Surgeon: Saud Bentley MD;  Location: [facility] ENDOSCOPY;  Service: TA w/low grade dysplasia x 2   • ENDOSCOPY N/A 7/11/2017     Procedure: ESOPHAGOGASTRODUODENOSCOPY with biopsies;  Surgeon: Saud Bentley MD;  Location: [facility] ENDOSCOPY;  Service: LA Grade A esophagitis, erythematous mucosa in stomach, multiple gastric polyps, chronic gastritis, mucosal hyperplasia   • HYSTERECTOMY     • SINUS SURGERY     • UPPER GASTROINTESTINAL ENDOSCOPY  07/11/2017    LA Grade A reflux esophagitis, Erythematous mucosa in the stomach, Multiple gastric polyps       FAMILY HISTORY  Family History   Problem Relation Age of Onset   • TRAE disease Mother    • Colon polyps Mother    • Malig Hyperthermia Neg Hx        SOCIAL HISTORY  Social History     Socioeconomic History   • Marital status: Single     Spouse name: Not on file   • Number of children: Not on file   • Years of education: Not on file   • Highest education level: Not on file   Tobacco Use   • Smoking status: Former Smoker     Packs/day: 2.00     Types: Cigarettes   • Smokeless tobacco: Never Used   • Tobacco comment: JIMMY 20 YRS AGO   Substance and Sexual Activity   • Alcohol use: No   • Drug use: No   • Sexual activity: Defer       ALLERGIES  Codeine; Penicillins; Demerol [meperidine]; Dust mite extract; Levofloxacin; Lisinopril; Meperidine hcl; and Adhesive tape    REVIEW OF SYSTEMS  Review of Systems   Constitutional: Negative for fever.   HENT: Negative for sore throat.    Eyes: Negative.    Respiratory: Positive for shortness of breath. Negative for cough.    Cardiovascular: Positive for palpitations. Negative for chest pain.   Gastrointestinal: Positive for abdominal distention (epigastric), abdominal pain, diarrhea and nausea. Negative for vomiting.   Genitourinary: Negative for dysuria.   Musculoskeletal: Negative for neck pain.   Skin: Negative for rash.   Neurological: Negative for weakness, numbness and headaches.   Hematological: Negative.    Psychiatric/Behavioral: Negative.    All other systems reviewed and are negative.      PHYSICAL EXAM  ED Triage Vitals [04/01/19 1241]   Temp Heart  Rate Resp BP SpO2   99 °F (37.2 °C) 97 -- 160/90 97 %      Temp src Heart Rate Source Patient Position BP Location FiO2 (%)   Tympanic Monitor -- -- --       Physical Exam   Constitutional: She is oriented to person, place, and time. No distress.   HENT:   Head: Normocephalic and atraumatic.   Eyes: EOM are normal. Pupils are equal, round, and reactive to light.   Neck: Normal range of motion. Neck supple.   Cardiovascular: Normal rate, regular rhythm and normal heart sounds.   Pulmonary/Chest: Effort normal and breath sounds normal. No respiratory distress.   Abdominal: Soft. There is generalized tenderness. There is no rebound, no guarding and negative Browne's sign.   Musculoskeletal: Normal range of motion. She exhibits no edema.   Neurological: She is alert and oriented to person, place, and time. She has normal sensation and normal strength.   Skin: Skin is warm and dry. No rash noted.   Psychiatric: Mood and affect normal.   Nursing note and vitals reviewed.      LAB RESULTS  Lab Results (last 24 hours)     Procedure Component Value Units Date/Time    CBC & Differential [[id number]] Collected:  04/01/19 1328    Specimen:  Blood Updated:  04/01/19 1345    Narrative:       The following orders were created for panel order CBC & Differential.  Procedure                               Abnormality         Status                     ---------                               -----------         ------                     CBC Auto Differential[[id number]]        Abnormal            Final result                 Please view results for these tests on the individual orders.    Comprehensive Metabolic Panel [[id number]]  (Abnormal) Collected:  04/01/19 1328    Specimen:  Blood Updated:  04/01/19 1410     Glucose 152 mg/dL      BUN 10 mg/dL      Creatinine 0.83 mg/dL      Sodium 141 mmol/L      Potassium 3.8 mmol/L      Chloride 102 mmol/L      CO2 26.6 mmol/L      Calcium 8.7 mg/dL      Total Protein 7.4 g/dL      Albumin 3.90  g/dL      ALT (SGPT) 27 U/L      AST (SGOT) 25 U/L      Alkaline Phosphatase 72 U/L      Total Bilirubin 0.3 mg/dL      eGFR Non African Amer 70 mL/min/1.73      Globulin 3.5 gm/dL      A/G Ratio 1.1 g/dL      BUN/Creatinine Ratio 12.0     Anion Gap 12.4 mmol/L     Narrative:       GFR Normal >60  Chronic Kidney Disease <60  Kidney Failure <15    Lipase [[id number]]  (Normal) Collected:  04/01/19 1328    Specimen:  Blood Updated:  04/01/19 1409     Lipase 43 U/L     Troponin [[id number]]  (Normal) Collected:  04/01/19 1328    Specimen:  Blood Updated:  04/01/19 1409     Troponin T <0.010 ng/mL     Narrative:       Troponin T Reference Range:  <= 0.03 ng/mL-   Negative for AMI  >0.03 ng/mL-     Abnormal for myocardial necrosis.  Clinicians would have to utilize clinical acumen, EKG, Troponin and serial changes to determine if it is an Acute Myocardial Infarction or myocardial injury due to an underlying chronic condition.     D-dimer, Quantitative [[id number]]  (Normal) Collected:  04/01/19 1328    Specimen:  Blood Updated:  04/01/19 1401     D-Dimer, Quantitative 0.31 MCGFEU/mL     Narrative:       The Stago D-Dimer test used in conjunction with a clinical pretest probability (PTP) assessment model, has been approved by the FDA to rule out the presence of venous thromboembolism (VTE) in outpatients suspected of deep venous thrombosis (DVT) or pulmonary embolism (PE). The cut-off for negative predictive value is <0.50 MCGFEU/mL.    Lactic Acid, Plasma [[id number]]  (Abnormal) Collected:  04/01/19 1328    Specimen:  Blood Updated:  04/01/19 1409     Lactate 2.6 mmol/L     Procalcitonin [[id number]]  (Abnormal) Collected:  04/01/19 1328    Specimen:  Blood Updated:  04/01/19 1417     Procalcitonin 0.07 ng/mL     Narrative:       As a Marker for Sepsis (Non-Neonates):   1. <0.5 ng/mL represents a low risk of severe sepsis and/or septic shock.  1. >2 ng/mL represents a high risk of severe sepsis and/or septic shock.    As a  "Marker for Lower Respiratory Tract Infections that require antibiotic therapy:  PCT on Admission     Antibiotic Therapy             6-12 Hrs later  > 0.5                Strongly Recommended            >0.25 - <0.5         Recommended  0.1 - 0.25           Discouraged                   Remeasure/reassess PCT  <0.1                 Strongly Discouraged          Remeasure/reassess PCT      As 28 day mortality risk marker: \"Change in Procalcitonin Result\" (> 80 % or <=80 %) if Day 0 (or Day 1) and Day 4 values are available. Refer to http://www.[employer] [facility]Cl[facility]-pct-calculator.com/   Change in PCT <=80 %   A decrease of PCT levels below or equal to 80 % defines a positive change in PCT test result representing a higher risk for 28-day all-cause mortality of patients diagnosed with severe sepsis or septic shock.  Change in PCT > 80 %   A decrease of PCT levels of more than 80 % defines a negative change in PCT result representing a lower risk for 28-day all-cause mortality of patients diagnosed with severe sepsis or septic shock.                Magnesium [[id number]]  (Normal) Collected:  04/01/19 1328    Specimen:  Blood Updated:  04/01/19 1409     Magnesium 2.1 mg/dL     CBC Auto Differential [[id number]]  (Abnormal) Collected:  04/01/19 1328    Specimen:  Blood Updated:  04/01/19 1345     WBC 7.90 10*3/mm3      RBC 4.08 10*6/mm3      Hemoglobin 12.1 g/dL      Hematocrit 38.9 %      MCV 95.3 fL      MCH 29.7 pg      MCHC 31.1 g/dL      RDW 15.9 %      RDW-SD 55.4 fl      MPV 9.4 fL      Platelets 306 10*3/mm3      Neutrophil % 73.0 %      Lymphocyte % 17.3 %      Monocyte % 6.1 %      Eosinophil % 2.8 %      Basophil % 0.3 %      Immature Grans % 0.5 %      Neutrophils, Absolute 5.77 10*3/mm3      Lymphocytes, Absolute 1.37 10*3/mm3      Monocytes, Absolute 0.48 10*3/mm3      Eosinophils, Absolute 0.22 10*3/mm3      Basophils, Absolute 0.02 10*3/mm3      Immature Grans, Absolute 0.04 10*3/mm3      nRBC 0.0 /100 WBC     Lactic Acid, " Reflex Timer (This will reflex a repeat order 3-3:15 hours after ordered.) [[id number]] Collected:  04/01/19 1328    Specimen:  Blood Updated:  04/01/19 1409    Lactic Acid, Reflex Timer (This will reflex a repeat order 3-3:15 hours after ordered.) [[id number]] Collected:  04/01/19 1600    Specimen:  Blood Updated:  04/01/19 1606          I ordered the above labs and reviewed the results    RADIOLOGY  XR Chest 1 View   Final Result      CT Abdomen Pelvis With Contrast   Preliminary Result   1. Fatty infiltration of the liver.   2. Status post cholecystectomy and hysterectomy.   3. A few segments of bowel contain fluid which may represent minimal   changes of enteritis. No bowel wall thickening or dilatation is seen.       Radiation dose reduction techniques were utilized, including automated   exposure control and exposure modulation based on body size.               Reviewed CXR which shows nothing acute. Independently viewed by me. Interpreted by radiologist.      I ordered the above noted radiological studies. Interpreted by radiologist. Discussed with radiologist (Dr. Salas). Reviewed by me in PACS.       PROCEDURES  Procedures  EKG          EKG time: 1322  Rhythm/Rate: SR, 90  P waves and IL: normal  QRS, axis: normal   ST and T waves: normal     Interpreted Contemporaneously by me, independently viewed  No prior for comparison.      PROGRESS AND CONSULTS     1320  Ordered labs, CXR, and EKG for further evaluation. Ordered IV fluids for hydration and zofran and pepcid to treat her abdominal symptoms.     1414  Ordered CT abd/pelvis for further evaluation.     1420  Repeat Lactic acid pending.     1650  Repeat lactic acid level is 1.2  per . Result can not be entered into the computer.     1652  Results discussed with patient. Since lactic acid level is now normal, cardiac testing is normal and CT shows enteritis- will discharge home with symptomatic treatment. Return if symptoms worsens and follow up  closely with PCP. Patient understands. Feels better after ED treatment.   Patient states her Mother has called her and she is having the same symptoms now.       MEDICAL DECISION MAKING  Results were reviewed/discussed with the patient and they were also made aware of online access. Pt also made aware that some labs, such as cultures, will not be resulted during ER visit and follow up with PMD is necessary.     MDM  Number of Diagnoses or Management Options     Amount and/or Complexity of Data Reviewed  Clinical lab tests: ordered and reviewed (Lactate=2.6, d-dimer negative, troponin negative.)  Tests in the radiology section of CPT®: ordered and reviewed (CXR shows nothing acute.)  Tests in the medicine section of CPT®: reviewed and ordered (See EKG Note.)           DIAGNOSIS  Final diagnoses:   Enteritis   Dehydration   Palpitations       DISPOSITION  DISCHARGE    Patient discharged in stable condition.    Reviewed implications of results, diagnosis, meds, responsibility to follow up, warning signs and symptoms of possible worsening, potential complications and reasons to return to ER.    Patient/Family voiced understanding of above instructions.    Discussed plan for discharge, as there is no emergent indication for admission. Patient referred to primary care provider for BP management due to today's BP. Pt/family is agreeable and understands need for follow up and repeat testing.  Pt is aware that discharge does not mean that nothing is wrong but it indicates no emergency is present that requires admission and they must continue care with follow-up as given below or physician of their choice.     FOLLOW-UP  Claudio Small MD  [address] [facility] [address]  [id number]  [facility] [address] [phone]    Schedule an appointment as soon as possible for a visit   If symptoms worsen or do not improve. Return to the ED as needed.         Medication List      New Prescriptions    dicyclomine 10 MG capsule  Commonly known as:   BENTYL  Take 1 capsule by mouth 4 (Four) Times a Day As Needed (pain).     ondansetron 4 MG tablet  Commonly known as:  ZOFRAN  Take 1 tablet by mouth Every 6 (Six) Hours.              Latest Documented Vital Signs:  As of 5:02 PM  BP- 160/90 HR- 97 Temp- 99 °F (37.2 °C) (Tympanic) O2 sat- 97%    --  Documentation assistance provided by ml Fuentes for Dr. Rajan.  Information recorded by the scribe was done at my direction and has been verified and validated by me.       Donna Fuentes  04/01/19 2783       Santiago Rajan MD  04/01/19 2395

## 2019-04-01 NOTE — ED TRIAGE NOTES
"Pt states \"I woke up in the middle of the night feeling like my heart was racing. I also have been having some pain in my left shoulder.\"  "

## 2019-08-28 ENCOUNTER — OFFICE VISIT (OUTPATIENT)
Dept: CARDIOLOGY | Facility: CLINIC | Age: 59
End: 2019-08-28

## 2019-08-28 VITALS
DIASTOLIC BLOOD PRESSURE: 70 MMHG | HEART RATE: 78 BPM | WEIGHT: 253 LBS | HEIGHT: 62 IN | BODY MASS INDEX: 46.56 KG/M2 | SYSTOLIC BLOOD PRESSURE: 118 MMHG

## 2019-08-28 DIAGNOSIS — I49.1 PREMATURE ATRIAL CONTRACTIONS: ICD-10-CM

## 2019-08-28 DIAGNOSIS — I49.3 PVC'S (PREMATURE VENTRICULAR CONTRACTIONS): ICD-10-CM

## 2019-08-28 DIAGNOSIS — I47.1 PSVT (PAROXYSMAL SUPRAVENTRICULAR TACHYCARDIA) (HCC): ICD-10-CM

## 2019-08-28 DIAGNOSIS — I10 ESSENTIAL HYPERTENSION: ICD-10-CM

## 2019-08-28 DIAGNOSIS — R00.2 PALPITATIONS: Primary | ICD-10-CM

## 2019-08-28 PROBLEM — I47.10 PSVT (PAROXYSMAL SUPRAVENTRICULAR TACHYCARDIA): Status: ACTIVE | Noted: 2019-08-28

## 2019-08-28 PROCEDURE — 99214 OFFICE O/P EST MOD 30 MIN: CPT | Performed by: INTERNAL MEDICINE

## 2019-08-28 RX ORDER — METOPROLOL SUCCINATE 100 MG/1
100 TABLET, EXTENDED RELEASE ORAL DAILY
Qty: 90 TABLET | Refills: 2 | Status: SHIPPED | OUTPATIENT
Start: 2019-08-28 | End: 2020-03-02 | Stop reason: DRUGHIGH

## 2019-08-28 RX ORDER — SITAGLIPTIN 100 MG/1
1 TABLET, FILM COATED ORAL DAILY
COMMUNITY
Start: 2019-08-21 | End: 2022-11-25

## 2019-08-28 RX ORDER — SERTRALINE HYDROCHLORIDE 25 MG/1
25 TABLET, FILM COATED ORAL DAILY
COMMUNITY
End: 2020-03-02 | Stop reason: ALTCHOICE

## 2019-08-28 NOTE — PROGRESS NOTES
"  Subjective:     Encounter Date:08/28/2019      Patient ID: Sri Kellogg is a 59 y.o. female.    Chief Complaint:  Chief Complaint   Patient presents with   • Palpitations       HPI:  I saw Ms. Kellogg in the office today.  She is a 59-year-old female with a history of fibromyalgia, obstructive sleep apnea, palpitations and atypical chest discomfort.  She is also overweight.  She had a nuclear stress test last year which demonstrated an anterior wall fixed defect but no ischemia.  Her ejection fraction was normal.  She did have a monitor which demonstrated PACs as well as runs of SVT.  She had approximately 9 runs of supraventricular tachycardia with the fastest run at 152 bpm.  The longest run was 12 beats at 120 bpm.  She is aware of the PACs and she is also aware of the PSVT.  She states that the single PACs are new since but the SVT runs \"scare her\".  She does have some dizziness but I am not sure that this is related to her SVT.  She continues to have some atypical chest discomfort.  She has no significant lower extremity edema, orthopnea or paroxysmal nocturnal dyspnea    The following portions of the patient's history were reviewed and updated as appropriate: allergies, current medications, past family history, past medical history, past social history, past surgical history and problem list.    Problem List:  Patient Active Problem List   Diagnosis   • Vitamin D deficiency   • Colon polyps   • PVC's (premature ventricular contractions)   • Hypertension   • Hyperlipidemia   • History of lupus   • GERD (gastroesophageal reflux disease)   • Fibromyalgia   • Diabetes mellitus (CMS/HCC)   • Cancer (CMS/HCC)   • Anxiety and depression   • PSVT (paroxysmal supraventricular tachycardia) (CMS/HCC)   • Premature atrial contractions       Past Medical History:  Past Medical History:   Diagnosis Date   • Anxiety and depression    • Cancer (CMS/HCC)     skin   • Colon polyp    • Diabetes mellitus (CMS/HCC)    • Dry skin  "   • Fibromyalgia    • GERD (gastroesophageal reflux disease)    • History of lupus    • Hyperlipidemia    • Hypertension    • PVC's (premature ventricular contractions)    • SOB (shortness of breath)        Past Surgical History:  Past Surgical History:   Procedure Laterality Date   • BREAST LUMPECTOMY     • CARDIAC CATHETERIZATION  01/01/2013    Normal coronary arteries   • CHOLECYSTECTOMY     • COLONOSCOPY  2014   • COLONOSCOPY N/A 7/11/2017    Procedure: COLONOSCOPY to cecum with random biopsies, and hot snare and cold polypectomies;  Surgeon: Saud Bentley MD;  [facility];  Service: TA w/low grade dysplasia x 2   • ENDOSCOPY N/A 7/11/2017    Procedure: ESOPHAGOGASTRODUODENOSCOPY with biopsies;  Surgeon: Saud Bentley MD;  [facility];  Service: LA Grade A esophagitis, erythematous mucosa in stomach, multiple gastric polyps, chronic gastritis, mucosal hyperplasia   • HYSTERECTOMY     • SINUS SURGERY     • UPPER GASTROINTESTINAL ENDOSCOPY  07/11/2017    LA Grade A reflux esophagitis, Erythematous mucosa in the stomach, Multiple gastric polyps       Social History:  Social History     Socioeconomic History   • Marital status: Single     Spouse name: Not on file   • Number of children: Not on file   • Years of education: Not on file   • Highest education level: Not on file   Tobacco Use   • Smoking status: Former Smoker     Packs/day: 2.00     Types: Cigarettes   • Smokeless tobacco: Never Used   • Tobacco comment: QIUT 20 YRS AGO   Substance and Sexual Activity   • Alcohol use: No   • Drug use: No   • Sexual activity: Defer       Allergies:  Allergies   Allergen Reactions   • Codeine Palpitations, GI Intolerance and Shortness Of Breath   • Penicillins Arrhythmia and Shortness Of Breath     RAPID HEARTRATE   • Demerol [Meperidine] Hives   • Dust Mite Extract Other (See Comments)     SNEEZING, SOA   • Levofloxacin Other (See Comments)     RIGID JAW   • Lisinopril Cough   • Meperidine Hcl  "Hives   • Adhesive Tape Rash           ROS:  Review of Systems   Constitution: Positive for malaise/fatigue. Negative for chills, decreased appetite, fever, weight gain and weight loss.   HENT: Negative for congestion, hoarse voice, nosebleeds and sore throat.    Eyes: Negative for blurred vision, double vision and visual disturbance.   Cardiovascular: Positive for chest pain and palpitations. Negative for claudication, dyspnea on exertion, irregular heartbeat, leg swelling, near-syncope, orthopnea, paroxysmal nocturnal dyspnea and syncope.   Respiratory: Negative for cough, hemoptysis, shortness of breath, sleep disturbances due to breathing, snoring, sputum production and wheezing.    Endocrine: Negative for cold intolerance, heat intolerance, polydipsia and polyuria.   Hematologic/Lymphatic: Negative for adenopathy and bleeding problem. Does not bruise/bleed easily.   Skin: Negative for flushing, itching, nail changes and rash.   Musculoskeletal: Positive for joint pain and myalgias. Negative for arthritis, back pain, muscle cramps, muscle weakness and neck pain.   Gastrointestinal: Positive for heartburn. Negative for bloating, abdominal pain, anorexia, change in bowel habit, constipation, diarrhea, hematemesis, hematochezia, jaundice, melena, nausea and vomiting.   Genitourinary: Negative for dysuria, hematuria and nocturia.   Neurological: Negative for brief paralysis, disturbances in coordination, excessive daytime sleepiness, dizziness, headaches, light-headedness, loss of balance, numbness, paresthesias, seizures and vertigo.   Psychiatric/Behavioral: Negative for altered mental status and depression. The patient is not nervous/anxious.    Allergic/Immunologic: Negative for environmental allergies and hives.          Objective:         /70   Pulse 78   Ht 157.5 cm (62\")   Wt 115 kg (253 lb)   BMI 46.27 kg/m²     Physical Exam   Constitutional: She is oriented to person, place, and time. She " appears well-developed and well-nourished. No distress.   HENT:   Head: Normocephalic and atraumatic.   Mouth/Throat: Oropharynx is clear and moist.   Eyes: Conjunctivae and EOM are normal. Pupils are equal, round, and reactive to light. No scleral icterus.   Neck: Normal range of motion. Neck supple. No thyromegaly present.   Cardiovascular: Normal rate, regular rhythm, S1 normal, S2 normal and intact distal pulses.  No extrasystoles are present. PMI is not displaced. Exam reveals no gallop, no S3, no S4, no friction rub and no decreased pulses.   No murmur heard.  Pulmonary/Chest: Effort normal and breath sounds normal. No respiratory distress. She has no wheezes. She has no rales.   Abdominal: Soft. Bowel sounds are normal. She exhibits no distension and no mass. There is no tenderness. There is no rebound and no guarding.   Musculoskeletal: Normal range of motion. She exhibits no edema.   Lymphadenopathy:     She has no cervical adenopathy.   Neurological: She is alert and oriented to person, place, and time. Coordination normal.   Skin: Skin is warm and dry. No rash noted. She is not diaphoretic. No pallor.   Psychiatric: She has a normal mood and affect. Her behavior is normal.   Nursing note and vitals reviewed.      In-Office Procedure(s):  Procedures    ASCVD RIsk Score::  The ASCVD Risk score (Glo KALIA Jr., et al., 2013) failed to calculate for the following reasons:    Cannot find a previous HDL lab    Cannot find a previous total cholesterol lab    Recent Radiology:  Imaging Results (most recent)     None          Lab Review:   not applicable           Invalid input(s): ALKPO4                        Invalid input(s): LDLCALC                  Problems Addressed this Visit        Cardiovascular and Mediastinum    PVC's (premature ventricular contractions)    Relevant Medications    metoprolol succinate XL (TOPROL-XL) 100 MG 24 hr tablet    Hypertension     Controlled         Relevant Medications     metoprolol succinate XL (TOPROL-XL) 100 MG 24 hr tablet    PSVT (paroxysmal supraventricular tachycardia) (CMS/HCC)     I am going to increase her metoprolol to 100 mg daily to see if her symptoms improve.  She is going to have her CPAP evaluated to see if there needs to be an adjustment.  If her symptoms do not improve with the above measures I will send her to see Dr. Fan         Relevant Medications    metoprolol succinate XL (TOPROL-XL) 100 MG 24 hr tablet    Premature atrial contractions     She is symptomatic with her PACs         Relevant Medications    metoprolol succinate XL (TOPROL-XL) 100 MG 24 hr tablet      Other Visit Diagnoses     Palpitations    -  Primary    Relevant Medications    metoprolol succinate XL (TOPROL-XL) 100 MG 24 hr tablet          Sally Anders MD  08/28/19  .

## 2019-08-28 NOTE — ASSESSMENT & PLAN NOTE
I am going to increase her metoprolol to 100 mg daily to see if her symptoms improve.  She is going to have her CPAP evaluated to see if there needs to be an adjustment.  If her symptoms do not improve with the above measures I will send her to see Dr. Fan

## 2019-08-30 ENCOUNTER — TELEPHONE (OUTPATIENT)
Dept: CARDIOLOGY | Facility: CLINIC | Age: 59
End: 2019-08-30

## 2019-08-30 NOTE — TELEPHONE ENCOUNTER
Ms. Valdez called inquiring about some testing she had in feb, stated she  coulnt find it in her my chart. Can you call her please

## 2019-09-19 ENCOUNTER — TELEPHONE (OUTPATIENT)
Dept: CARDIOLOGY | Facility: CLINIC | Age: 59
End: 2019-09-19

## 2019-09-20 NOTE — TELEPHONE ENCOUNTER
Spoke to the patient, informed her the reason why she could not see her results in [facility], is because she did not have those tests at [facility]. Sent her copies of the stress test and Zio results, she will have to call [facility] for the Echo results. dmk

## 2020-03-02 ENCOUNTER — OFFICE VISIT (OUTPATIENT)
Dept: CARDIOLOGY | Facility: CLINIC | Age: 60
End: 2020-03-02

## 2020-03-02 VITALS
WEIGHT: 255 LBS | SYSTOLIC BLOOD PRESSURE: 144 MMHG | DIASTOLIC BLOOD PRESSURE: 84 MMHG | HEIGHT: 62 IN | BODY MASS INDEX: 46.93 KG/M2 | HEART RATE: 80 BPM

## 2020-03-02 DIAGNOSIS — G47.33 OSA (OBSTRUCTIVE SLEEP APNEA): ICD-10-CM

## 2020-03-02 DIAGNOSIS — E78.2 MIXED HYPERLIPIDEMIA: ICD-10-CM

## 2020-03-02 DIAGNOSIS — I47.1 PSVT (PAROXYSMAL SUPRAVENTRICULAR TACHYCARDIA) (HCC): ICD-10-CM

## 2020-03-02 DIAGNOSIS — I49.3 PVC'S (PREMATURE VENTRICULAR CONTRACTIONS): ICD-10-CM

## 2020-03-02 DIAGNOSIS — I10 ESSENTIAL HYPERTENSION: ICD-10-CM

## 2020-03-02 DIAGNOSIS — R06.09 DYSPNEA ON EXERTION: Primary | ICD-10-CM

## 2020-03-02 PROCEDURE — 99213 OFFICE O/P EST LOW 20 MIN: CPT | Performed by: INTERNAL MEDICINE

## 2020-03-02 RX ORDER — METOPROLOL SUCCINATE 25 MG/1
25 TABLET, EXTENDED RELEASE ORAL DAILY
Qty: 90 TABLET | Refills: 3 | Status: SHIPPED | OUTPATIENT
Start: 2020-03-02 | End: 2021-04-29

## 2020-03-02 RX ORDER — METOPROLOL SUCCINATE 50 MG/1
1 TABLET, EXTENDED RELEASE ORAL DAILY
COMMUNITY
Start: 2020-02-03 | End: 2021-04-29

## 2020-03-02 NOTE — PROGRESS NOTES
Subjective:     Encounter Date:03/02/2020      Patient ID: Sri Kellogg is a 59 y.o. female.    Chief Complaint:  Chief Complaint   Patient presents with   • Palpitations       HPI:  History of Present Illness  I saw Sri in the office today.  She is a 59-year-old female with fibromyalgia, obstructive sleep apnea, palpitations and chest discomfort.  She had a nuclear stress test last year which demonstrated anterior wall defect which was fixed but no ischemia and monitor demonstrated PACs as well as runs of SVT.  She had 9 runs of SVT.  She was symptomatic.  I did increase her metoprolol to 100 mg daily..  She states that after 1 dose she felt more fatigued and somewhat dizzy so she went back to the 50 mg dose.    She continues to have palpitations many of which are awaken her at night.  She does use CPAP but feels like she is not getting enough pressure from her mask.  Many of the palpitations do awaken her at night.  No symptoms of chest discomfort.  She does have some mild lower extremity edema.  No orthopnea or paroxysmal nocturnal dyspnea.  She does not exercise and realizes she needs to lose weight.     The following portions of the patient's history were reviewed and updated as appropriate: allergies, current medications, past family history, past medical history, past social history, past surgical history and problem list.    Problem List:  Patient Active Problem List   Diagnosis   • Vitamin D deficiency   • Colon polyps   • PVC's (premature ventricular contractions)   • Hypertension   • Hyperlipidemia   • History of lupus (CMS/[facility])   • GERD (gastroesophageal reflux disease)   • Fibromyalgia   • Diabetes mellitus (CMS/[facility])   • Cancer (CMS/[facility])   • Anxiety and depression   • PSVT (paroxysmal supraventricular tachycardia) (CMS/[facility])   • Premature atrial contractions   • DANTE (obstructive sleep apnea)   • Dyspnea on exertion       Past Medical History:  Past Medical History:   Diagnosis Date   • Anxiety and  depression    • Cancer (CMS/HCC)     skin   • Colon polyp    • Diabetes mellitus (CMS/HCC)    • Dry skin    • Fibromyalgia    • GERD (gastroesophageal reflux disease)    • History of lupus (CMS/HCC)    • Hyperlipidemia    • Hypertension    • PVC's (premature ventricular contractions)    • SOB (shortness of breath)        Past Surgical History:  Past Surgical History:   Procedure Laterality Date   • BREAST LUMPECTOMY     • CARDIAC CATHETERIZATION  01/01/2013    Normal coronary arteries   • CHOLECYSTECTOMY     • COLONOSCOPY  2014   • COLONOSCOPY N/A 7/11/2017    Procedure: COLONOSCOPY to cecum with random biopsies, and hot snare and cold polypectomies;  Surgeon: Saud Bentley MD;  Location: [facility] ENDOSCOPY;  Service: TA w/low grade dysplasia x 2   • ENDOSCOPY N/A 7/11/2017    Procedure: ESOPHAGOGASTRODUODENOSCOPY with biopsies;  Surgeon: Saud Bentley MD;  Location: [facility] ENDOSCOPY;  Service: LA Grade A esophagitis, erythematous mucosa in stomach, multiple gastric polyps, chronic gastritis, mucosal hyperplasia   • HYSTERECTOMY     • SINUS SURGERY     • UPPER GASTROINTESTINAL ENDOSCOPY  07/11/2017    LA Grade A reflux esophagitis, Erythematous mucosa in the stomach, Multiple gastric polyps       Social History:  Social History     Socioeconomic History   • Marital status: Single     Spouse name: Not on file   • Number of children: Not on file   • Years of education: Not on file   • Highest education level: Not on file   Tobacco Use   • Smoking status: Former Smoker     Packs/day: 2.00     Types: Cigarettes   • Smokeless tobacco: Never Used   • Tobacco comment: QIUT 20 YRS AGO   Substance and Sexual Activity   • Alcohol use: No   • Drug use: No   • Sexual activity: Defer       Allergies:  Allergies   Allergen Reactions   • Codeine Palpitations, GI Intolerance and Shortness Of Breath   • Penicillins Arrhythmia and Shortness Of Breath     RAPID HEARTRATE   • Demerol [Meperidine] Hives   • Meperidine Hcl Hives   • Adhesive  Tape Rash   • Dust Mite Extract Other (See Comments)     SNEEZING, SOA   • Levofloxacin Other (See Comments)     RIGID JAW   • Lisinopril Cough       Immunizations:    There is no immunization history on file for this patient.    ROS:  Review of Systems   Constitution: Positive for malaise/fatigue. Negative for chills, decreased appetite, fever, weight gain and weight loss.   HENT: Negative for congestion, hoarse voice, nosebleeds and sore throat.    Eyes: Negative for blurred vision, double vision and visual disturbance.   Cardiovascular: Positive for palpitations. Negative for claudication, dyspnea on exertion, irregular heartbeat, leg swelling, near-syncope, orthopnea, paroxysmal nocturnal dyspnea and syncope.   Respiratory: Negative for cough, hemoptysis, shortness of breath, sleep disturbances due to breathing, snoring, sputum production and wheezing.    Endocrine: Negative for cold intolerance, heat intolerance, polydipsia and polyuria.   Hematologic/Lymphatic: Negative for adenopathy and bleeding problem. Does not bruise/bleed easily.   Skin: Negative for flushing, itching, nail changes and rash.   Musculoskeletal: Positive for joint pain. Negative for arthritis, back pain, muscle cramps, muscle weakness and neck pain.   Gastrointestinal: Positive for heartburn. Negative for bloating, abdominal pain, anorexia, change in bowel habit, constipation, diarrhea, hematemesis, hematochezia, jaundice, melena, nausea and vomiting.   Genitourinary: Negative for dysuria, hematuria and nocturia.   Neurological: Negative for brief paralysis, disturbances in coordination, excessive daytime sleepiness, dizziness, headaches, light-headedness, loss of balance, numbness, paresthesias, seizures and vertigo.   Psychiatric/Behavioral: Negative for altered mental status and depression. The patient is not nervous/anxious.    Allergic/Immunologic: Negative for environmental allergies and hives.          Objective:         /84   " Pulse 80   Ht 157.5 cm (62\")   Wt 116 kg (255 lb)   BMI 46.64 kg/m²     Physical Exam   Constitutional: She is oriented to person, place, and time. She appears well-developed and well-nourished. No distress.   HENT:   Head: Normocephalic and atraumatic.   Mouth/Throat: Oropharynx is clear and moist.   Eyes: Pupils are equal, round, and reactive to light. Conjunctivae and EOM are normal. No scleral icterus.   Neck: Normal range of motion. Neck supple. No thyromegaly present.   Cardiovascular: Normal rate, regular rhythm, S1 normal, S2 normal and intact distal pulses.  No extrasystoles are present. PMI is not displaced. Exam reveals no gallop, no S3, no S4, no friction rub and no decreased pulses.   No murmur heard.  Pulses:       Carotid pulses are 2+ on the right side, and 2+ on the left side.       Radial pulses are 2+ on the right side, and 2+ on the left side.        Dorsalis pedis pulses are 2+ on the right side, and 2+ on the left side.        Posterior tibial pulses are 2+ on the right side, and 2+ on the left side.   Pulmonary/Chest: Effort normal and breath sounds normal. No respiratory distress. She has no wheezes. She has no rales.   Abdominal: Soft. Bowel sounds are normal. She exhibits no distension and no mass. There is no tenderness. There is no rebound and no guarding.   Musculoskeletal: Normal range of motion. She exhibits edema (Trivial bilateral lower extremity edema).   Lymphadenopathy:     She has no cervical adenopathy.   Neurological: She is alert and oriented to person, place, and time. Coordination normal.   Skin: Skin is warm and dry. No rash noted. She is not diaphoretic. No pallor.   Psychiatric: She has a normal mood and affect. Her behavior is normal.   Nursing note and vitals reviewed.      In-Office Procedure(s):  Procedures    ASCVD RIsk Score::  The ASCVD Risk score (Glo KALIA Puga, et al., 2013) failed to calculate for the following reasons:    Cannot find a previous HDL lab    " Cannot find a previous total cholesterol lab    Recent Radiology:  Imaging Results (Most Recent)     None          Lab Review:   not applicable             Assessment:          Diagnosis Plan   1. Dyspnea on exertion  Ambulatory Referral to Pulmonology   2. Essential hypertension     3. Mixed hyperlipidemia     4. PSVT (paroxysmal supraventricular tachycardia) (CMS/HCC)  metoprolol succinate XL (TOPROL-XL) 25 MG 24 hr tablet   5. PVC's (premature ventricular contractions)     6. DANTE (obstructive sleep apnea)  Ambulatory Referral to Pulmonology          Plan:      I am going to increase her metoprolol succinate to 75 mg daily.  She will take 50 mg in the morning and 20 5 at night.  Perhaps this will cause less symptoms of fatigue.  I have encouraged exercise and diet  She would like to see a new pulmonologist and I will arrange that referral.      Level of Care:                 Sally Anders MD  03/02/20  .

## 2020-07-27 ENCOUNTER — TELEMEDICINE (OUTPATIENT)
Dept: CARDIOLOGY | Facility: CLINIC | Age: 60
End: 2020-07-27

## 2020-07-27 VITALS — HEIGHT: 62 IN | BODY MASS INDEX: 46.93 KG/M2 | WEIGHT: 255 LBS

## 2020-07-27 DIAGNOSIS — R00.2 PALPITATIONS: ICD-10-CM

## 2020-07-27 DIAGNOSIS — R60.0 BILATERAL LOWER EXTREMITY EDEMA: ICD-10-CM

## 2020-07-27 DIAGNOSIS — E78.2 MIXED HYPERLIPIDEMIA: ICD-10-CM

## 2020-07-27 DIAGNOSIS — I10 ESSENTIAL HYPERTENSION: Primary | ICD-10-CM

## 2020-07-27 DIAGNOSIS — I47.1 PSVT (PAROXYSMAL SUPRAVENTRICULAR TACHYCARDIA) (HCC): ICD-10-CM

## 2020-07-27 PROBLEM — R42 DIZZINESS: Status: ACTIVE | Noted: 2020-07-27

## 2020-07-27 PROBLEM — R53.82 CHRONIC FATIGUE: Status: ACTIVE | Noted: 2020-07-27

## 2020-07-27 PROCEDURE — 99214 OFFICE O/P EST MOD 30 MIN: CPT | Performed by: INTERNAL MEDICINE

## 2020-07-27 RX ORDER — SERTRALINE HYDROCHLORIDE 25 MG/1
25 TABLET, FILM COATED ORAL DAILY
COMMUNITY
End: 2022-11-25

## 2020-11-18 ENCOUNTER — TELEMEDICINE (OUTPATIENT)
Dept: CARDIOLOGY | Facility: CLINIC | Age: 60
End: 2020-11-18

## 2020-11-18 DIAGNOSIS — I47.1 PSVT (PAROXYSMAL SUPRAVENTRICULAR TACHYCARDIA) (HCC): ICD-10-CM

## 2020-11-18 DIAGNOSIS — R00.2 PALPITATIONS: ICD-10-CM

## 2020-11-18 DIAGNOSIS — I49.3 PVC'S (PREMATURE VENTRICULAR CONTRACTIONS): ICD-10-CM

## 2020-11-18 DIAGNOSIS — I10 ESSENTIAL HYPERTENSION: Primary | ICD-10-CM

## 2020-11-18 PROCEDURE — 99214 OFFICE O/P EST MOD 30 MIN: CPT | Performed by: INTERNAL MEDICINE

## 2020-11-18 NOTE — PROGRESS NOTES
Subjective:     Encounter Date:11/18/2020      Patient ID: Sri Kellogg is a 60 y.o. female.    Chief Complaint:  Chief Complaint   Patient presents with   • Follow-up       HPI:  History of Present Illness     You have chosen to receive care through a telehealth visit.  Do you consent to use a video/audio connection for your medical care today? Yes     I had a video visit with Ms. Kellogg today.  She is a 60-year-old female with multiple medical issues.  She has a history of palpitations.  Previous monitors have demonstrated PACs and some runs of PSVT.  She also has essential hypertension and dyslipidemia.  She has a history of lupus, reflux, fibromyalgia and anxiety and depression.  She also has obstructive sleep apnea and uses CPAP.  She does have thyroid issues.    Ms. Kellogg had some dental work last week and she is complaining of some tenderness.  Antibiotics have been called in for her.  She is having occasional palpitations.  She does feel this in her ear.  The episodes are not frequent.  The palpitations are not causing dizziness or near syncope.  She is not complaining of chest discomfort or shortness of air.    She is practicing social distancing.  She has had no known exposure to COVID-19.  No current symptoms of the virus  The following portions of the patient's history were reviewed and updated as appropriate: allergies, current medications, past family history, past medical history, past social history, past surgical history and problem list.    Problem List:  Patient Active Problem List   Diagnosis   • Vitamin D deficiency   • Colon polyps   • PVC's (premature ventricular contractions)   • Hypertension   • Hyperlipidemia   • History of lupus   • GERD (gastroesophageal reflux disease)   • Fibromyalgia   • Diabetes mellitus (CMS/HCC)   • Cancer (CMS/HCC)   • Anxiety and depression   • PSVT (paroxysmal supraventricular tachycardia) (CMS/HCC)   • Premature atrial contractions   • DANTE (obstructive sleep  apnea)   • Dyspnea on exertion   • Bilateral lower extremity edema   • Palpitations   • Chronic fatigue   • Dizziness       Past Medical History:  Past Medical History:   Diagnosis Date   • Anxiety and depression    • Cancer (CMS/HCC)     skin   • Colon polyp    • Diabetes mellitus (CMS/HCC)    • Dry skin    • Fibromyalgia    • GERD (gastroesophageal reflux disease)    • History of lupus    • Hyperlipidemia    • Hypertension    • PVC's (premature ventricular contractions)    • SOB (shortness of breath)        Past Surgical History:  Past Surgical History:   Procedure Laterality Date   • BREAST LUMPECTOMY     • CARDIAC CATHETERIZATION  01/01/2013    Normal coronary arteries   • CHOLECYSTECTOMY     • COLONOSCOPY  2014   • COLONOSCOPY N/A 7/11/2017    Procedure: COLONOSCOPY to cecum with random biopsies, and hot snare and cold polypectomies;  Surgeon: Saud Bentley MD;  [facility] END[facility];  Service: TA w/low grade dysplasia x 2   • ENDOSCOPY N/A 7/11/2017    Procedure: ESOPHAGOGASTRODUODENOSCOPY with biopsies;  Surgeon: Saud Bentley MD;  [facility];  Service: LA Grade A esophagitis, erythematous mucosa in stomach, multiple gastric polyps, chronic gastritis, mucosal hyperplasia   • HYSTERECTOMY     • SINUS SURGERY     • UPPER GASTROINTESTINAL ENDOSCOPY  07/11/2017    LA Grade A reflux esophagitis, Erythematous mucosa in the stomach, Multiple gastric polyps       Social History:  Social History     Socioeconomic History   • Marital status: Single     Spouse name: Not on file   • Number of children: Not on file   • Years of education: Not on file   • Highest education level: Not on file   Tobacco Use   • Smoking status: Former Smoker     Packs/day: 2.00     Types: Cigarettes   • Smokeless tobacco: Never Used   • Tobacco comment: QIUT 20 YRS AGO   Substance and Sexual Activity   • Alcohol use: No   • Drug use: No   • Sexual activity: Defer       Allergies:  Allergies   Allergen Reactions   • Codeine  Palpitations, GI Intolerance and Shortness Of Breath   • Penicillins Arrhythmia and Shortness Of Breath     RAPID HEARTRATE   • Demerol [Meperidine] Hives   • Meperidine Hcl Hives   • Adhesive Tape Rash   • Dust Mite Extract Other (See Comments)     SNEEZING, SOA   • Levofloxacin Other (See Comments)     RIGID JAW   • Lisinopril Cough       Immunizations:    There is no immunization history on file for this patient.    ROS:  Review of Systems   Constitution: Negative for chills, decreased appetite, fever, malaise/fatigue, weight gain and weight loss.   HENT: Negative for congestion, hoarse voice, nosebleeds and sore throat.    Eyes: Negative for blurred vision, double vision and visual disturbance.   Cardiovascular: Positive for palpitations. Negative for chest pain, claudication, dyspnea on exertion, irregular heartbeat, leg swelling, near-syncope, orthopnea, paroxysmal nocturnal dyspnea and syncope.   Respiratory: Negative for cough, hemoptysis, shortness of breath, sleep disturbances due to breathing, snoring, sputum production and wheezing.    Endocrine: Negative for cold intolerance, heat intolerance, polydipsia and polyuria.   Hematologic/Lymphatic: Negative for adenopathy and bleeding problem. Does not bruise/bleed easily.   Skin: Negative for flushing, itching, nail changes and rash.   Musculoskeletal: Negative for arthritis, back pain, joint pain, muscle cramps, muscle weakness, myalgias and neck pain.   Gastrointestinal: Negative for bloating, abdominal pain, anorexia, change in bowel habit, constipation, diarrhea, heartburn, hematemesis, hematochezia, jaundice, melena, nausea and vomiting.   Genitourinary: Negative for dysuria, hematuria and nocturia.   Neurological: Negative for brief paralysis, disturbances in coordination, excessive daytime sleepiness, dizziness, headaches, light-headedness, loss of balance, numbness, paresthesias, seizures and vertigo.   Psychiatric/Behavioral: Negative for altered  mental status and depression. The patient is not nervous/anxious.    Allergic/Immunologic: Negative for environmental allergies and hives.          Objective:         There were no vitals taken for this visit.    Physical Exam  Unable to perform physical exam secondary to video visit.  In-Office Procedure(s):  Procedures    ASCVD RIsk Score::  The ASCVD Risk score (Glo MOTTA Jr., et al., 2013) failed to calculate for the following reasons:    Cannot find a previous HDL lab    Cannot find a previous total cholesterol lab    Recent Radiology:  Imaging Results (Most Recent)     None          Lab Review:   not applicable             Assessment:          Diagnosis Plan   1. Essential hypertension     2. PVC's (premature ventricular contractions)     3. PSVT (paroxysmal supraventricular tachycardia) (CMS/[facility])     4. Palpitations            Plan:    Overall, Ms. Kellogg is stable from a cardiovascular standpoint.  I have not changed her medications.  Her palpitations are not frequent and are not causing significant symptoms.  She will continue her metoprolol.     Length of video visit was 19 minutes and 40 seconds  EMR documentation was 10 minutes    Level of Care:                 Sally Anders MD  11/18/20  .

## 2021-03-16 ENCOUNTER — BULK ORDERING (OUTPATIENT)
Dept: CASE MANAGEMENT | Facility: OTHER | Age: 61
End: 2021-03-16

## 2021-03-16 DIAGNOSIS — Z23 IMMUNIZATION DUE: ICD-10-CM

## 2021-04-29 DIAGNOSIS — I47.1 PSVT (PAROXYSMAL SUPRAVENTRICULAR TACHYCARDIA) (HCC): ICD-10-CM

## 2021-04-29 RX ORDER — METOPROLOL SUCCINATE 25 MG/1
25 TABLET, EXTENDED RELEASE ORAL DAILY
Qty: 90 TABLET | Refills: 0 | Status: SHIPPED | OUTPATIENT
Start: 2021-04-29 | End: 2021-06-21 | Stop reason: SDUPTHER

## 2021-05-18 PROBLEM — R00.2 PALPITATIONS: Chronic | Status: ACTIVE | Noted: 2020-07-27

## 2021-05-18 PROBLEM — I47.1 PSVT (PAROXYSMAL SUPRAVENTRICULAR TACHYCARDIA): Chronic | Status: ACTIVE | Noted: 2019-08-28

## 2021-05-18 PROBLEM — I49.1 PREMATURE ATRIAL CONTRACTIONS: Chronic | Status: ACTIVE | Noted: 2019-08-28

## 2021-05-18 PROBLEM — G47.33 OSA (OBSTRUCTIVE SLEEP APNEA): Chronic | Status: ACTIVE | Noted: 2020-03-02

## 2021-05-18 PROBLEM — I47.10 PSVT (PAROXYSMAL SUPRAVENTRICULAR TACHYCARDIA): Chronic | Status: ACTIVE | Noted: 2019-08-28

## 2021-06-21 ENCOUNTER — TELEMEDICINE (OUTPATIENT)
Dept: CARDIOLOGY | Facility: CLINIC | Age: 61
End: 2021-06-21

## 2021-06-21 VITALS — BODY MASS INDEX: 47.84 KG/M2 | WEIGHT: 260 LBS | HEIGHT: 62 IN

## 2021-06-21 DIAGNOSIS — I10 ESSENTIAL HYPERTENSION: Primary | Chronic | ICD-10-CM

## 2021-06-21 DIAGNOSIS — R00.2 PALPITATIONS: Chronic | ICD-10-CM

## 2021-06-21 DIAGNOSIS — I47.1 PSVT (PAROXYSMAL SUPRAVENTRICULAR TACHYCARDIA) (HCC): ICD-10-CM

## 2021-06-21 DIAGNOSIS — E78.5 DYSLIPIDEMIA: ICD-10-CM

## 2021-06-21 PROCEDURE — 99214 OFFICE O/P EST MOD 30 MIN: CPT | Performed by: INTERNAL MEDICINE

## 2021-06-21 RX ORDER — METOPROLOL SUCCINATE 50 MG/1
50 TABLET, EXTENDED RELEASE ORAL EVERY MORNING
Qty: 90 TABLET | Refills: 1 | Status: SHIPPED | OUTPATIENT
Start: 2021-06-21

## 2021-06-21 RX ORDER — LEVOTHYROXINE SODIUM 0.05 MG/1
50 TABLET ORAL DAILY
COMMUNITY
Start: 2021-06-14

## 2021-06-21 RX ORDER — METOPROLOL SUCCINATE 25 MG/1
25 TABLET, EXTENDED RELEASE ORAL EVERY EVENING
Qty: 90 TABLET | Refills: 1 | Status: SHIPPED | OUTPATIENT
Start: 2021-06-21

## 2021-06-21 NOTE — PROGRESS NOTES
"Chief Complaint  Palpitations    Subjective    History of Present Illness      You have chosen to receive care through a telehealth visit.  Do you consent to use a video/audio connection for your medical care today? Yes    I had a video visit with Ms. Kellogg today. She is a 61-year-old female with multiple medical issues.  She has a history of palpitations.  Previous monitors have demonstrated PACs and some runs of PSVT.  She also has essential hypertension and dyslipidemia.  She has a history of lupus, reflux, fibromyalgia and anxiety and depression.  She also has obstructive sleep apnea and uses CPAP.  She does have thyroid issues.    Ms. Kellogg does continue to have palpitations which she describes as flutter sensations.  She is currently on metoprolol extended release 50 mg in the morning and 25 mg in the evening.  She is trying to take 100 mg daily but this made her feel very fatigued.  She is not experiencing chest tightness or heaviness.  She does have shortness of air with exertion and she is fatigued.  She is having some thyroid issues and is scheduled to see an endocrinologist.  In addition she has been sedentary over the past year due to the pandemic.  Her fibromyalgia also is contributing to some of her symptoms.  She was asking about the COVID-19 vaccine.  We did discuss the CDC guidelines.  In the end, I told her it would be a personal decision for her to make    Objective   Vital Signs:   Ht 157.5 cm (62\")   Wt 118 kg (260 lb)   BMI 47.55 kg/m²     Physical Exam    Unable to perform physical exam secondary to video visit  Result Review :   The following data was reviewed by: Sally Anders MD on 06/21/2021:                          Assessment and Plan    1. Essential hypertension  Controlled    2. Palpitations  She is on metoprolol.    3. Dyslipidemia  She has not had a lipid profile obtained in quite some time.  She states that she is scheduled to have some lab work in the near future    Overall, Ms. " Valdez is stable from a cardiovascular standpoint.  She does continue to have occasional palpitations.  I have encouraged increased activity.    Length of video visit was 22 minutes  EMR documentation was 10 minutes          Follow Up   No follow-ups on file.  Patient was given instructions and counseling regarding her condition or for health maintenance advice. Please see specific information pulled into the AVS if appropriate.

## 2021-11-16 ENCOUNTER — TELEMEDICINE (OUTPATIENT)
Dept: CARDIOLOGY | Facility: CLINIC | Age: 61
End: 2021-11-16

## 2021-11-16 VITALS — WEIGHT: 265 LBS | HEIGHT: 62 IN | BODY MASS INDEX: 48.76 KG/M2

## 2021-11-16 DIAGNOSIS — R00.2 PALPITATIONS: Primary | Chronic | ICD-10-CM

## 2021-11-16 DIAGNOSIS — I49.3 PVC'S (PREMATURE VENTRICULAR CONTRACTIONS): Chronic | ICD-10-CM

## 2021-11-16 DIAGNOSIS — I10 PRIMARY HYPERTENSION: Chronic | ICD-10-CM

## 2021-11-16 PROCEDURE — 99215 OFFICE O/P EST HI 40 MIN: CPT | Performed by: INTERNAL MEDICINE

## 2021-11-16 NOTE — PROGRESS NOTES
"Chief Complaint  Palpitations    Subjective    History of Present Illness      You have chosen to receive care through a telehealth visit.  Do you consent to use a video/audio connection for your medical care today? Yes    I had a video visit with Ms. Kellogg today. I had a video visit with Ms. Kellogg today. She is a 61-year-old female with multiple medical issues.  She has a history of palpitations.  Previous monitors have demonstrated PACs and some runs of PSVT.  She also has essential hypertension and dyslipidemia.  She has a history of lupus, reflux, fibromyalgia and anxiety and depression.  She also has obstructive sleep apnea and uses CPAP.  She does have thyroid issues and gastrointestinal issues.    Ms. Kellogg states that she had a recent episode of palpitations which is described as a sustained tachycardia.  She states that it causes dizziness, nausea and anxiety.  She thought that she was going to pass out or die.  She states that she is awakening in the middle the night with tachycardia.  She does have PVCs but understands what the PVCs feel like and that this is different.  She has had numerous monitors placed which demonstrate rhythms as described above.  She is on a beta-blocker therapy and cannot tolerate a higher dose.  She states that she is trying to lose weight by diet but has not exercised as yet due to fear from her heart.    Objective   Vital Signs:   Ht 157.5 cm (62\")   Wt 120 kg (265 lb)   BMI 48.47 kg/m²     Physical Exam   Unable to perform complete physical exam secondary to video visit.  She was alert and oriented.  Conjunctive are noninjected.  Result Review :   The following data was reviewed by: Sally Anders MD on 11/16/2021:                            Assessment and Plan    1. Primary hypertension  Controlled    2. Palpitations  Recurrent palpitations.  Previous monitors with episodes of PACs, PVCs and PSVT.  The arrhythmias are more symptomatic recently.  She has multiple medical " issues and many things seem to be tied together.  Nevertheless her episode that she experienced recently in association with dizziness and near syncope is somewhat worrisome for a sustained SVT.    We have discussed another monitor but this seems nonproductive.  We discussed a [employer] mobile, which she is interested in purchasing.  I do feel that this would be of benefit to her due to the lack of consistent frequency of the arrhythmia.  She does have sleep apnea and she is using her CPAP in a compliant fashion.  I am going to order an echocardiogram since it has been a few years since her previous study and she does have shortness of air, which I feel is most likely weight related but need to make sure LV function has remained normal.    Length of video visit was 45 minutes  EMR documentation 15 minutes        I spent 60 minutes caring for Sri on this date of service. This time includes time spent by me in the following activities:preparing for the visit, obtaining and/or reviewing a separately obtained history, performing a medically appropriate examination and/or evaluation , counseling and educating the patient/family/caregiver, ordering medications, tests, or procedures and documenting information in the medical record  Follow Up   No follow-ups on file.  Patient was given instructions and counseling regarding her condition or for health maintenance advice. Please see specific information pulled into the AVS if appropriate.

## 2022-01-14 ENCOUNTER — HOSPITAL ENCOUNTER (OUTPATIENT)
Dept: CARDIOLOGY | Facility: HOSPITAL | Age: 62
Discharge: HOME | End: 2022-01-14
Admitting: INTERNAL MEDICINE

## 2022-01-14 VITALS
SYSTOLIC BLOOD PRESSURE: 133 MMHG | HEIGHT: 62 IN | HEART RATE: 77 BPM | WEIGHT: 265 LBS | BODY MASS INDEX: 48.76 KG/M2 | DIASTOLIC BLOOD PRESSURE: 72 MMHG

## 2022-01-14 DIAGNOSIS — R00.2 PALPITATIONS: ICD-10-CM

## 2022-01-14 LAB
AORTIC DIMENSIONLESS INDEX: 1 (DI)
ASCENDING AORTA: 2.4 CM
BH CV ECHO MEAS - ACS: 2 CM
BH CV ECHO MEAS - AO ARCH DIAM (PROXIMAL TRANS.): 2.5 CM
BH CV ECHO MEAS - AO MAX PG (FULL): 1.3 MMHG
BH CV ECHO MEAS - AO MAX PG: 8.4 MMHG
BH CV ECHO MEAS - AO MEAN PG (FULL): 0.35 MMHG
BH CV ECHO MEAS - AO MEAN PG: 4.2 MMHG
BH CV ECHO MEAS - AO ROOT AREA (BSA CORRECTED): 1.3
BH CV ECHO MEAS - AO ROOT AREA: 5.9 CM^2
BH CV ECHO MEAS - AO ROOT DIAM: 2.7 CM
BH CV ECHO MEAS - AO V2 MAX: 145.2 CM/SEC
BH CV ECHO MEAS - AO V2 MEAN: 92.9 CM/SEC
BH CV ECHO MEAS - AO V2 VTI: 28.5 CM
BH CV ECHO MEAS - ASC AORTA: 2.4 CM
BH CV ECHO MEAS - AVA(I,A): 2.4 CM^2
BH CV ECHO MEAS - AVA(I,D): 2.4 CM^2
BH CV ECHO MEAS - AVA(V,A): 2.1 CM^2
BH CV ECHO MEAS - AVA(V,D): 2.1 CM^2
BH CV ECHO MEAS - BSA(HAYCOCK): 2.4 M^2
BH CV ECHO MEAS - BSA: 2.2 M^2
BH CV ECHO MEAS - BZI_BMI: 48.5 KILOGRAMS/M^2
BH CV ECHO MEAS - BZI_METRIC_HEIGHT: 157.5 CM
BH CV ECHO MEAS - BZI_METRIC_WEIGHT: 120.2 KG
BH CV ECHO MEAS - EDV(CUBED): 110 ML
BH CV ECHO MEAS - EDV(MOD-SP2): 60 ML
BH CV ECHO MEAS - EDV(MOD-SP4): 68 ML
BH CV ECHO MEAS - EDV(TEICH): 107.1 ML
BH CV ECHO MEAS - EF(CUBED): 84.4 %
BH CV ECHO MEAS - EF(MOD-BP): 61.4 %
BH CV ECHO MEAS - EF(MOD-SP2): 60 %
BH CV ECHO MEAS - EF(MOD-SP4): 61.8 %
BH CV ECHO MEAS - EF(TEICH): 77.4 %
BH CV ECHO MEAS - ESV(CUBED): 17.2 ML
BH CV ECHO MEAS - ESV(MOD-SP2): 24 ML
BH CV ECHO MEAS - ESV(MOD-SP4): 26 ML
BH CV ECHO MEAS - ESV(TEICH): 24.2 ML
BH CV ECHO MEAS - FS: 46.1 %
BH CV ECHO MEAS - IVS/LVPW: 0.89
BH CV ECHO MEAS - IVSD: 0.97 CM
BH CV ECHO MEAS - LAT PEAK E' VEL: 11 CM/SEC
BH CV ECHO MEAS - LV DIASTOLIC VOL/BSA (35-75): 31.6 ML/M^2
BH CV ECHO MEAS - LV MASS(C)D: 177.5 GRAMS
BH CV ECHO MEAS - LV MASS(C)DI: 82.4 GRAMS/M^2
BH CV ECHO MEAS - LV MAX PG: 7.1 MMHG
BH CV ECHO MEAS - LV MEAN PG: 3.8 MMHG
BH CV ECHO MEAS - LV SYSTOLIC VOL/BSA (12-30): 12.1 ML/M^2
BH CV ECHO MEAS - LV V1 MAX: 133.5 CM/SEC
BH CV ECHO MEAS - LV V1 MEAN: 90 CM/SEC
BH CV ECHO MEAS - LV V1 VTI: 29.5 CM
BH CV ECHO MEAS - LVIDD: 4.8 CM
BH CV ECHO MEAS - LVIDS: 2.6 CM
BH CV ECHO MEAS - LVLD AP2: 6.9 CM
BH CV ECHO MEAS - LVLD AP4: 7.4 CM
BH CV ECHO MEAS - LVLS AP2: 5.8 CM
BH CV ECHO MEAS - LVLS AP4: 6.2 CM
BH CV ECHO MEAS - LVOT AREA (M): 2.3 CM^2
BH CV ECHO MEAS - LVOT AREA: 2.3 CM^2
BH CV ECHO MEAS - LVOT DIAM: 1.7 CM
BH CV ECHO MEAS - LVPWD: 1.1 CM
BH CV ECHO MEAS - MED PEAK E' VEL: 9.4 CM/SEC
BH CV ECHO MEAS - MV A DUR: 0.09 SEC
BH CV ECHO MEAS - MV A MAX VEL: 79.3 CM/SEC
BH CV ECHO MEAS - MV DEC SLOPE: 510 CM/SEC^2
BH CV ECHO MEAS - MV DEC TIME: 160 SEC
BH CV ECHO MEAS - MV E MAX VEL: 96.8 CM/SEC
BH CV ECHO MEAS - MV E/A: 1.2
BH CV ECHO MEAS - MV MAX PG: 3.5 MMHG
BH CV ECHO MEAS - MV MEAN PG: 1.7 MMHG
BH CV ECHO MEAS - MV P1/2T MAX VEL: 108.6 CM/SEC
BH CV ECHO MEAS - MV P1/2T: 62.4 MSEC
BH CV ECHO MEAS - MV V2 MAX: 93.7 CM/SEC
BH CV ECHO MEAS - MV V2 MEAN: 63.3 CM/SEC
BH CV ECHO MEAS - MV V2 VTI: 24.6 CM
BH CV ECHO MEAS - MVA P1/2T LCG: 2 CM^2
BH CV ECHO MEAS - MVA(P1/2T): 3.5 CM^2
BH CV ECHO MEAS - MVA(VTI): 2.8 CM^2
BH CV ECHO MEAS - PA ACC TIME: 0.06 SEC
BH CV ECHO MEAS - PA MAX PG (FULL): 0.32 MMHG
BH CV ECHO MEAS - PA MAX PG: 5.2 MMHG
BH CV ECHO MEAS - PA PR(ACCEL): 52.3 MMHG
BH CV ECHO MEAS - PA V2 MAX: 114 CM/SEC
BH CV ECHO MEAS - PULM A REVS DUR: 0.11 SEC
BH CV ECHO MEAS - PULM A REVS VEL: 34.4 CM/SEC
BH CV ECHO MEAS - PULM DIAS VEL: 59.7 CM/SEC
BH CV ECHO MEAS - PULM S/D: 1.2
BH CV ECHO MEAS - PULM SYS VEL: 71.9 CM/SEC
BH CV ECHO MEAS - RAP SYSTOLE: 3 MMHG
BH CV ECHO MEAS - RV MAX PG: 4.9 MMHG
BH CV ECHO MEAS - RV MEAN PG: 2.9 MMHG
BH CV ECHO MEAS - RV V1 MAX: 110.5 CM/SEC
BH CV ECHO MEAS - RV V1 MEAN: 81 CM/SEC
BH CV ECHO MEAS - RV V1 VTI: 20.3 CM
BH CV ECHO MEAS - RVSP: 22 MMHG
BH CV ECHO MEAS - SI(AO): 77.7 ML/M^2
BH CV ECHO MEAS - SI(CUBED): 43.1 ML/M^2
BH CV ECHO MEAS - SI(LVOT): 31.6 ML/M^2
BH CV ECHO MEAS - SI(MOD-SP2): 16.7 ML/M^2
BH CV ECHO MEAS - SI(MOD-SP4): 19.5 ML/M^2
BH CV ECHO MEAS - SI(TEICH): 38.5 ML/M^2
BH CV ECHO MEAS - SV(AO): 167.4 ML
BH CV ECHO MEAS - SV(CUBED): 92.8 ML
BH CV ECHO MEAS - SV(LVOT): 68.1 ML
BH CV ECHO MEAS - SV(MOD-SP2): 36 ML
BH CV ECHO MEAS - SV(MOD-SP4): 42 ML
BH CV ECHO MEAS - SV(TEICH): 82.9 ML
BH CV ECHO MEAS - TAPSE (>1.6): 2 CM
BH CV ECHO MEAS - TR MAX PG: 19 MMHG
BH CV ECHO MEAS - TR MAX VEL: 218.5 CM/SEC
BH CV ECHO MEASUREMENTS AVERAGE E/E' RATIO: 9.49
BH CV XLRA - RV BASE: 2.6 CM
BH CV XLRA - RV LENGTH: 7.1 CM
BH CV XLRA - RV MID: 2 CM
BH CV XLRA - TDI S': 10 CM/SEC
LEFT ATRIUM VOLUME INDEX: 22.1 ML/M2
MAXIMAL PREDICTED HEART RATE: 158 BPM
SINUS: 2.5 CM
STJ: 2.3 CM
STRESS TARGET HR: 134 BPM

## 2022-01-14 PROCEDURE — 93306 TTE W/DOPPLER COMPLETE: CPT

## 2022-01-14 PROCEDURE — 93306 TTE W/DOPPLER COMPLETE: CPT | Performed by: INTERNAL MEDICINE

## 2022-01-20 ENCOUNTER — OFFICE VISIT (OUTPATIENT)
Dept: GASTROENTEROLOGY | Facility: CLINIC | Age: 62
End: 2022-01-20

## 2022-01-20 VITALS
TEMPERATURE: 95 F | OXYGEN SATURATION: 94 % | SYSTOLIC BLOOD PRESSURE: 137 MMHG | DIASTOLIC BLOOD PRESSURE: 88 MMHG | BODY MASS INDEX: 48.14 KG/M2 | HEART RATE: 64 BPM | HEIGHT: 62 IN | WEIGHT: 261.6 LBS

## 2022-01-20 DIAGNOSIS — R10.12 LUQ ABDOMINAL PAIN: ICD-10-CM

## 2022-01-20 DIAGNOSIS — K59.00 CONSTIPATION, UNSPECIFIED CONSTIPATION TYPE: ICD-10-CM

## 2022-01-20 DIAGNOSIS — R11.0 NAUSEA: ICD-10-CM

## 2022-01-20 DIAGNOSIS — R13.10 DYSPHAGIA, UNSPECIFIED TYPE: ICD-10-CM

## 2022-01-20 DIAGNOSIS — K21.9 GASTROESOPHAGEAL REFLUX DISEASE, UNSPECIFIED WHETHER ESOPHAGITIS PRESENT: Primary | ICD-10-CM

## 2022-01-20 PROCEDURE — 99204 OFFICE O/P NEW MOD 45 MIN: CPT | Performed by: INTERNAL MEDICINE

## 2022-01-20 RX ORDER — FAMOTIDINE 20 MG/1
20 TABLET, FILM COATED ORAL
Status: DISCONTINUED | OUTPATIENT
Start: 2022-01-20 | End: 2022-11-25
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
## 2022-01-21 LAB
ALBUMIN SERPL-MCNC: 4.3 G/DL (ref 3.8–4.8)
ALBUMIN/GLOB SERPL: 1.5 {RATIO} (ref 1.2–2.2)
ALP SERPL-CCNC: 88 IU/L (ref 44–121)
ALT SERPL-CCNC: 23 IU/L (ref 0–32)
AMYLASE SERPL-CCNC: 75 U/L (ref 31–110)
AST SERPL-CCNC: 22 IU/L (ref 0–40)
BASOPHILS # BLD AUTO: 0.1 X10E3/UL (ref 0–0.2)
BASOPHILS NFR BLD AUTO: 1 %
BILIRUB SERPL-MCNC: 0.3 MG/DL (ref 0–1.2)
BUN SERPL-MCNC: 10 MG/DL (ref 8–27)
BUN/CREAT SERPL: 11 (ref 12–28)
CALCIUM SERPL-MCNC: 9.4 MG/DL (ref 8.7–10.3)
CHLORIDE SERPL-SCNC: 101 MMOL/L (ref 96–106)
CO2 SERPL-SCNC: 24 MMOL/L (ref 20–29)
CREAT SERPL-MCNC: 0.93 MG/DL (ref 0.57–1)
EOSINOPHIL # BLD AUTO: 0.6 X10E3/UL (ref 0–0.4)
EOSINOPHIL NFR BLD AUTO: 5 %
ERYTHROCYTE [DISTWIDTH] IN BLOOD BY AUTOMATED COUNT: 13.4 % (ref 11.7–15.4)
GLOBULIN SER CALC-MCNC: 2.9 G/DL (ref 1.5–4.5)
GLUCOSE SERPL-MCNC: 150 MG/DL (ref 65–99)
HCT VFR BLD AUTO: 41.8 % (ref 34–46.6)
HGB BLD-MCNC: 13.3 G/DL (ref 11.1–15.9)
IMM GRANULOCYTES # BLD AUTO: 0 X10E3/UL (ref 0–0.1)
IMM GRANULOCYTES NFR BLD AUTO: 0 %
LIPASE SERPL-CCNC: 289 U/L (ref 14–72)
LYMPHOCYTES # BLD AUTO: 3.7 X10E3/UL (ref 0.7–3.1)
LYMPHOCYTES NFR BLD AUTO: 34 %
MAGNESIUM SERPL-MCNC: 2.2 MG/DL (ref 1.6–2.3)
MCH RBC QN AUTO: 29.6 PG (ref 26.6–33)
MCHC RBC AUTO-ENTMCNC: 31.8 G/DL (ref 31.5–35.7)
MCV RBC AUTO: 93 FL (ref 79–97)
MONOCYTES # BLD AUTO: 1 X10E3/UL (ref 0.1–0.9)
MONOCYTES NFR BLD AUTO: 9 %
NEUTROPHILS # BLD AUTO: 5.4 X10E3/UL (ref 1.4–7)
NEUTROPHILS NFR BLD AUTO: 51 %
PLATELET # BLD AUTO: 324 X10E3/UL (ref 150–450)
POTASSIUM SERPL-SCNC: 5 MMOL/L (ref 3.5–5.2)
PROT SERPL-MCNC: 7.2 G/DL (ref 6–8.5)
RBC # BLD AUTO: 4.5 X10E6/UL (ref 3.77–5.28)
SODIUM SERPL-SCNC: 140 MMOL/L (ref 134–144)
TSH SERPL DL<=0.005 MIU/L-ACNC: 2.14 UIU/ML (ref 0.45–4.5)
WBC # BLD AUTO: 10.7 X10E3/UL (ref 3.4–10.8)

## 2022-01-24 ENCOUNTER — TELEPHONE (OUTPATIENT)
Dept: GASTROENTEROLOGY | Facility: CLINIC | Age: 62
End: 2022-01-24

## 2022-01-24 NOTE — TELEPHONE ENCOUNTER
Called pt and advised of Dr Bentley's note. She verb understanding.     Ct scan is ordered with iv and oral contrast and is ordered with pancreatic protocol.     Results sent to Dr Small thru [facility].

## 2022-01-24 NOTE — PROGRESS NOTES
"01/24/22       Tell her that her lab work showed a normal amylase, her lipase was mildly elevated at 289 (it was mildly elevated in 2017).  A lipase level could be elevated with pancreatitis and other issues?  Her CBC was normal.  Her comprehensive metabolic profile shows mildly elevated glucose of 150.  Her kidney function and liver function are normal, which is good.  Her TSH and magnesium levels are normal.       We will see what the CT of the abdomen and pelvis shows we will also see what the barium swallow shows.       Please call and make sure that the CT of the abdomen and pelvis with IV and oral contrast that has been ordered and will soon be done, is to be done with \"pancreatic protocol\" so that we can see the pancreas well (given the fact that her lipase level is elevated).       Please send a copy of this report to her PCP.  Steve de la vega"

## 2022-01-24 NOTE — TELEPHONE ENCOUNTER
"----- Message from Saud Bentley MD sent at 1/24/2022  6:26 AM EST -----  01/24/22       Tell her that her lab work showed a normal amylase, her lipase was mildly elevated at 289 (it was mildly elevated in 2017).  A lipase level could be elevated with pancreatitis and other issues?  Her CBC was normal.  Her comprehensive metabolic profile shows mildly elevated glucose of 150.  Her kidney function and liver function are normal, which is good.  Her TSH and magnesium levels are normal.       We will see what the CT of the abdomen and pelvis shows we will also see what the barium swallow shows.       Please call and make sure that the CT of the abdomen and pelvis with IV and oral contrast that has been ordered and will soon be done, is to be done with \"pancreatic protocol\" so that we can see the pancreas well (given the fact that her lipase level is elevated).       Please send a copy of this report to her PCP.  Steve de la vega  "

## 2022-01-27 ENCOUNTER — APPOINTMENT (OUTPATIENT)
Dept: CT IMAGING | Facility: HOSPITAL | Age: 62
End: 2022-01-27

## 2022-02-04 ENCOUNTER — APPOINTMENT (OUTPATIENT)
Dept: GENERAL RADIOLOGY | Facility: HOSPITAL | Age: 62
End: 2022-02-04

## 2022-02-07 ENCOUNTER — HOSPITAL ENCOUNTER (OUTPATIENT)
Dept: CT IMAGING | Facility: HOSPITAL | Age: 62
Discharge: HOME | End: 2022-02-07
Admitting: INTERNAL MEDICINE

## 2022-02-07 DIAGNOSIS — K21.9 GASTROESOPHAGEAL REFLUX DISEASE, UNSPECIFIED WHETHER ESOPHAGITIS PRESENT: ICD-10-CM

## 2022-02-07 DIAGNOSIS — K59.00 CONSTIPATION, UNSPECIFIED CONSTIPATION TYPE: ICD-10-CM

## 2022-02-07 DIAGNOSIS — R11.0 NAUSEA: ICD-10-CM

## 2022-02-07 DIAGNOSIS — R13.10 DYSPHAGIA, UNSPECIFIED TYPE: ICD-10-CM

## 2022-02-07 DIAGNOSIS — R10.12 LUQ ABDOMINAL PAIN: ICD-10-CM

## 2022-02-07 PROCEDURE — 0 DIATRIZOATE MEGLUMINE & SODIUM PER 1 ML: Performed by: INTERNAL MEDICINE

## 2022-02-07 PROCEDURE — 74177 CT ABD & PELVIS W/CONTRAST: CPT

## 2022-02-07 PROCEDURE — 25010000002 IOPAMIDOL 61 % SOLUTION: Performed by: INTERNAL MEDICINE

## 2022-02-07 RX ADMIN — DIATRIZOATE MEGLUMINE AND DIATRIZOATE SODIUM 30 ML: 660; 100 LIQUID ORAL; RECTAL at 09:50

## 2022-02-07 RX ADMIN — IOPAMIDOL 95 ML: 612 INJECTION, SOLUTION INTRAVENOUS at 11:20

## 2022-02-08 NOTE — PROGRESS NOTES
02/08/22       Please tell her that the CAT scan of the abdomen and pelvis shows marked fatty liver.  The pancreas looks normal.  She does have arthritis in the lower back.  The way to get rid of fatty liver would be weight loss.       Was a barium swallow going to be done also?  I thought that I had ordered a barium swallow because of her trouble swallowing?       Please send a copy of this report to her PCP.  Danita. [email or web address]

## 2022-02-09 ENCOUNTER — TELEPHONE (OUTPATIENT)
Dept: GASTROENTEROLOGY | Facility: CLINIC | Age: 62
End: 2022-02-09

## 2022-02-09 NOTE — TELEPHONE ENCOUNTER
Called pt and advised of Dr Bentley's note. Verb undestanding.     Pt reports that she rescheduled the barium swallow due to the bad weather last week.  Update sent to Dr Bentley.     Results sent to Dr Small thru [facility].

## 2022-02-09 NOTE — TELEPHONE ENCOUNTER
----- Message from Saud Bentley MD sent at 2/8/2022  4:40 PM EST -----  02/08/22       Please tell her that the CAT scan of the abdomen and pelvis shows marked fatty liver.  The pancreas looks normal.  She does have arthritis in the lower back.  The way to get rid of fatty liver would be weight loss.       Was a barium swallow going to be done also?  I thought that I had ordered a barium swallow because of her trouble swallowing?       Please send a copy of this report to her PCP.  Danita. [email or web address]

## 2022-02-22 ENCOUNTER — TELEPHONE (OUTPATIENT)
Dept: GASTROENTEROLOGY | Facility: CLINIC | Age: 62
End: 2022-02-22

## 2022-02-22 NOTE — TELEPHONE ENCOUNTER
----- Message from Willem Marion sent at 2/22/2022  3:21 PM EST -----  Regarding: barium swallow  Contact: [phone]  Dr. Bentley, Esophagus study,    pt wants to know if you can send over the order that was supposed to be sent over

## 2022-02-22 NOTE — TELEPHONE ENCOUNTER
Called pt and pt reports that when she tried to get her esophagram scheduled , [facility] told her that the order was wrong.  Advised pt we will call scheduling and then will call her back and update her. Verb understanding.     Called [facility] scheduling at [phone] and spoke with Adelita who advised that the pt's esophagram order is fine and she will call her to get her scheduled.

## 2022-02-25 ENCOUNTER — APPOINTMENT (OUTPATIENT)
Dept: GENERAL RADIOLOGY | Facility: HOSPITAL | Age: 62
End: 2022-02-25

## 2022-03-03 ENCOUNTER — OFFICE VISIT (OUTPATIENT)
Dept: GASTROENTEROLOGY | Facility: CLINIC | Age: 62
End: 2022-03-03

## 2022-03-03 VITALS — WEIGHT: 254 LBS | TEMPERATURE: 96.6 F | HEIGHT: 62 IN | BODY MASS INDEX: 46.74 KG/M2

## 2022-03-03 DIAGNOSIS — K21.9 GASTROESOPHAGEAL REFLUX DISEASE, UNSPECIFIED WHETHER ESOPHAGITIS PRESENT: ICD-10-CM

## 2022-03-03 DIAGNOSIS — K59.04 CHRONIC IDIOPATHIC CONSTIPATION: Primary | ICD-10-CM

## 2022-03-03 DIAGNOSIS — R11.0 NAUSEA: ICD-10-CM

## 2022-03-03 DIAGNOSIS — L29.0 ANAL PRURITUS: ICD-10-CM

## 2022-03-03 DIAGNOSIS — R10.12 LEFT UPPER QUADRANT ABDOMINAL PAIN: ICD-10-CM

## 2022-03-03 DIAGNOSIS — E11.9 TYPE 2 DIABETES MELLITUS WITHOUT COMPLICATION, WITHOUT LONG-TERM CURRENT USE OF INSULIN: ICD-10-CM

## 2022-03-03 DIAGNOSIS — R74.8 ELEVATED LIPASE: ICD-10-CM

## 2022-03-03 PROCEDURE — 99214 OFFICE O/P EST MOD 30 MIN: CPT | Performed by: PHYSICIAN ASSISTANT

## 2022-03-03 RX ORDER — FLUCONAZOLE 150 MG/1
150 TABLET ORAL ONCE
Qty: 1 TABLET | Refills: 2 | Status: SHIPPED | OUTPATIENT
Start: 2022-03-03 | End: 2022-03-03
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
## 2022-03-04 ENCOUNTER — TELEPHONE (OUTPATIENT)
Dept: GASTROENTEROLOGY | Facility: CLINIC | Age: 62
End: 2022-03-04

## 2022-03-04 NOTE — TELEPHONE ENCOUNTER
----- Message from Karen Browne PA-C sent at 3/3/2022  5:33 PM EST -----  Can you please let patient know that I talked with Dr. Bentley about her visit today.  In particular the elevated lipase.  He agrees that we should proceed with MRI of the pancreas to definitively rule out any complications with this.  Order placed.

## 2022-03-04 NOTE — TELEPHONE ENCOUNTER
Call to pt.  Advise per MARCUS Browne note.  Verb understanding.     Advise [facility] will contact to to arrange.  If have not heard in timely manner, contact [phone] to arrange.

## 2022-03-24 ENCOUNTER — HOSPITAL ENCOUNTER (OUTPATIENT)
Dept: GENERAL RADIOLOGY | Facility: HOSPITAL | Age: 62
Discharge: HOME | End: 2022-03-24
Admitting: INTERNAL MEDICINE

## 2022-03-24 DIAGNOSIS — K21.9 GASTROESOPHAGEAL REFLUX DISEASE, UNSPECIFIED WHETHER ESOPHAGITIS PRESENT: ICD-10-CM

## 2022-03-24 DIAGNOSIS — K59.00 CONSTIPATION, UNSPECIFIED CONSTIPATION TYPE: ICD-10-CM

## 2022-03-24 DIAGNOSIS — R13.10 DYSPHAGIA, UNSPECIFIED TYPE: ICD-10-CM

## 2022-03-24 DIAGNOSIS — R10.12 LUQ ABDOMINAL PAIN: ICD-10-CM

## 2022-03-24 DIAGNOSIS — R11.0 NAUSEA: ICD-10-CM

## 2022-03-24 PROCEDURE — 63710000001 BARIUM SULFATE 98 % RECONSTITUTED SUSPENSION: Performed by: INTERNAL MEDICINE

## 2022-03-24 PROCEDURE — A9270 NON-COVERED ITEM OR SERVICE: HCPCS | Performed by: INTERNAL MEDICINE

## 2022-03-24 PROCEDURE — 63710000001 BARIUM SULFATE 700 MG TABLET: Performed by: INTERNAL MEDICINE

## 2022-03-24 PROCEDURE — 63710000001 SOD BICARB-CITRIC ACID-SIMETHICONE 2.21-1.53-0.04 G PACK: Performed by: INTERNAL MEDICINE

## 2022-03-24 PROCEDURE — 63710000001 BARIUM SULFATE 96 % RECONSTITUTED SUSPENSION: Performed by: INTERNAL MEDICINE

## 2022-03-24 PROCEDURE — 74220 X-RAY XM ESOPHAGUS 1CNTRST: CPT

## 2022-03-24 PROCEDURE — 74221 X-RAY XM ESOPHAGUS 2CNTRST: CPT

## 2022-03-24 RX ADMIN — BARIUM SULFATE 700 MG: 700 TABLET ORAL at 10:49

## 2022-03-24 RX ADMIN — BARIUM SULFATE 135 ML: 980 POWDER, FOR SUSPENSION ORAL at 10:50

## 2022-03-24 RX ADMIN — BARIUM SULFATE 183 ML: 960 POWDER, FOR SUSPENSION ORAL at 10:50

## 2022-03-24 RX ADMIN — ANTACID/ANTIFLATULENT 1 PACKET: 380; 550; 10; 10 GRANULE, EFFERVESCENT ORAL at 10:50

## 2022-03-25 ENCOUNTER — TELEPHONE (OUTPATIENT)
Dept: CARDIOLOGY | Facility: CLINIC | Age: 62
End: 2022-03-25

## 2022-03-25 NOTE — TELEPHONE ENCOUNTER
PT has Esophagram test on 3/24/22 and it showed Mild Cardiomegaly. PT is concerned and wants CPA to look at it and give her thoughts.

## 2022-03-25 NOTE — TELEPHONE ENCOUNTER
She had an ultrasound of her heart in January 2022.  This is the more accurate study to look at her heart size than an esophagram is.  Her echo in January demonstrated normal left ventricular wall thickness and size.  Her ejection fraction was normal.

## 2022-04-04 ENCOUNTER — APPOINTMENT (OUTPATIENT)
Dept: DIABETES SERVICES | Facility: HOSPITAL | Age: 62
End: 2022-04-04

## 2022-04-11 ENCOUNTER — TELEPHONE (OUTPATIENT)
Dept: GASTROENTEROLOGY | Facility: CLINIC | Age: 62
End: 2022-04-11

## 2022-04-11 NOTE — TELEPHONE ENCOUNTER
VM to pt with request to contact office.     MRI scheduled for 4/15.     Esophogram faxed via epic to DR Claudio Small.

## 2022-04-11 NOTE — TELEPHONE ENCOUNTER
----- Message from Saud Bentley MD sent at 4/10/2022  4:31 PM EDT -----  04/10/22       Tell her that the barium esophagram came back normal which is good.  Has the MRI of the abdomen (MRCP) been done yet?       please send a copy of this report to her PCP.  Danita. [email or web address]

## 2022-04-15 ENCOUNTER — APPOINTMENT (OUTPATIENT)
Dept: MRI IMAGING | Facility: HOSPITAL | Age: 62
End: 2022-04-15

## 2023-02-17 ENCOUNTER — OFFICE VISIT (OUTPATIENT)
Dept: GASTROENTEROLOGY | Facility: CLINIC | Age: 63
End: 2023-02-17
Payer: MEDICARE

## 2023-02-17 ENCOUNTER — LAB (OUTPATIENT)
Dept: LAB | Facility: HOSPITAL | Age: 63
End: 2023-02-17
Payer: MEDICARE

## 2023-02-17 VITALS
SYSTOLIC BLOOD PRESSURE: 122 MMHG | WEIGHT: 255.4 LBS | DIASTOLIC BLOOD PRESSURE: 78 MMHG | BODY MASS INDEX: 47 KG/M2 | HEART RATE: 60 BPM | TEMPERATURE: 97.3 F | HEIGHT: 62 IN | OXYGEN SATURATION: 96 %

## 2023-02-17 DIAGNOSIS — K76.0 FATTY LIVER: ICD-10-CM

## 2023-02-17 DIAGNOSIS — Z12.11 ENCOUNTER FOR SCREENING FOR MALIGNANT NEOPLASM OF COLON: ICD-10-CM

## 2023-02-17 DIAGNOSIS — Z83.71 FH: COLON POLYPS: ICD-10-CM

## 2023-02-17 DIAGNOSIS — K59.00 CONSTIPATION, UNSPECIFIED CONSTIPATION TYPE: ICD-10-CM

## 2023-02-17 DIAGNOSIS — K21.9 GASTROESOPHAGEAL REFLUX DISEASE, UNSPECIFIED WHETHER ESOPHAGITIS PRESENT: ICD-10-CM

## 2023-02-17 DIAGNOSIS — R10.13 EPIGASTRIC PAIN: Primary | ICD-10-CM

## 2023-02-17 DIAGNOSIS — R74.8 ELEVATED LIPASE: ICD-10-CM

## 2023-02-17 PROBLEM — Z83.719 FH: COLON POLYPS: Status: ACTIVE | Noted: 2023-02-17

## 2023-02-17 LAB
ALBUMIN SERPL-MCNC: 4.1 G/DL (ref 3.5–5.2)
ALBUMIN/GLOB SERPL: 1.3 G/DL
ALP SERPL-CCNC: 85 U/L (ref 39–117)
ALT SERPL W P-5'-P-CCNC: 28 U/L (ref 1–33)
ANION GAP SERPL CALCULATED.3IONS-SCNC: 7 MMOL/L (ref 5–15)
AST SERPL-CCNC: 23 U/L (ref 1–32)
BASOPHILS # BLD AUTO: 0.05 10*3/MM3 (ref 0–0.2)
BASOPHILS NFR BLD AUTO: 0.5 % (ref 0–1.5)
BILIRUB SERPL-MCNC: 0.3 MG/DL (ref 0–1.2)
BUN SERPL-MCNC: 14 MG/DL (ref 8–23)
BUN/CREAT SERPL: 17.7 (ref 7–25)
CALCIUM SPEC-SCNC: 9.6 MG/DL (ref 8.6–10.5)
CHLORIDE SERPL-SCNC: 101 MMOL/L (ref 98–107)
CO2 SERPL-SCNC: 32 MMOL/L (ref 22–29)
CREAT SERPL-MCNC: 0.79 MG/DL (ref 0.57–1)
DEPRECATED RDW RBC AUTO: 44.5 FL (ref 37–54)
EGFRCR SERPLBLD CKD-EPI 2021: 84.2 ML/MIN/1.73
EOSINOPHIL # BLD AUTO: 0.28 10*3/MM3 (ref 0–0.4)
EOSINOPHIL NFR BLD AUTO: 2.6 % (ref 0.3–6.2)
ERYTHROCYTE [DISTWIDTH] IN BLOOD BY AUTOMATED COUNT: 13.3 % (ref 12.3–15.4)
GLOBULIN UR ELPH-MCNC: 3.2 GM/DL
GLUCOSE SERPL-MCNC: 128 MG/DL (ref 65–99)
HCT VFR BLD AUTO: 40.6 % (ref 34–46.6)
HGB BLD-MCNC: 13.2 G/DL (ref 12–15.9)
IMM GRANULOCYTES # BLD AUTO: 0.04 10*3/MM3 (ref 0–0.05)
IMM GRANULOCYTES NFR BLD AUTO: 0.4 % (ref 0–0.5)
LIPASE SERPL-CCNC: 114 U/L (ref 13–60)
LYMPHOCYTES # BLD AUTO: 4.3 10*3/MM3 (ref 0.7–3.1)
LYMPHOCYTES NFR BLD AUTO: 40 % (ref 19.6–45.3)
MCH RBC QN AUTO: 29.7 PG (ref 26.6–33)
MCHC RBC AUTO-ENTMCNC: 32.5 G/DL (ref 31.5–35.7)
MCV RBC AUTO: 91.4 FL (ref 79–97)
MONOCYTES # BLD AUTO: 0.86 10*3/MM3 (ref 0.1–0.9)
MONOCYTES NFR BLD AUTO: 8 % (ref 5–12)
NEUTROPHILS NFR BLD AUTO: 48.5 % (ref 42.7–76)
NEUTROPHILS NFR BLD AUTO: 5.22 10*3/MM3 (ref 1.7–7)
NRBC BLD AUTO-RTO: 0 /100 WBC (ref 0–0.2)
PLATELET # BLD AUTO: 345 10*3/MM3 (ref 140–450)
PMV BLD AUTO: 9.2 FL (ref 6–12)
POTASSIUM SERPL-SCNC: 5.1 MMOL/L (ref 3.5–5.2)
PROT SERPL-MCNC: 7.3 G/DL (ref 6–8.5)
RBC # BLD AUTO: 4.44 10*6/MM3 (ref 3.77–5.28)
SODIUM SERPL-SCNC: 140 MMOL/L (ref 136–145)
WBC NRBC COR # BLD: 10.75 10*3/MM3 (ref 3.4–10.8)

## 2023-02-17 PROCEDURE — 80053 COMPREHEN METABOLIC PANEL: CPT | Performed by: NURSE PRACTITIONER

## 2023-02-17 PROCEDURE — 99214 OFFICE O/P EST MOD 30 MIN: CPT | Performed by: NURSE PRACTITIONER

## 2023-02-17 PROCEDURE — 85025 COMPLETE CBC W/AUTO DIFF WBC: CPT | Performed by: NURSE PRACTITIONER

## 2023-02-17 PROCEDURE — 36415 COLL VENOUS BLD VENIPUNCTURE: CPT | Performed by: NURSE PRACTITIONER

## 2023-02-17 PROCEDURE — 83690 ASSAY OF LIPASE: CPT | Performed by: NURSE PRACTITIONER

## 2023-02-17 RX ORDER — PANTOPRAZOLE SODIUM 40 MG/1
40 TABLET, DELAYED RELEASE ORAL DAILY
Qty: 30 TABLET | Refills: 5 | Status: SHIPPED | OUTPATIENT
Start: 2023-02-17

## 2023-02-17 RX ORDER — AZITHROMYCIN 250 MG/1
TABLET, FILM COATED ORAL
COMMUNITY
Start: 2022-12-15 | End: 2023-02-17

## 2023-02-17 RX ORDER — TIZANIDINE 4 MG/1
4 TABLET ORAL 4 TIMES DAILY PRN
COMMUNITY
Start: 2023-01-31 | End: 2023-02-17

## 2023-02-17 RX ORDER — NAPROXEN 500 MG/1
TABLET ORAL
COMMUNITY
Start: 2023-01-25

## 2023-02-17 NOTE — PROGRESS NOTES
Chief Complaint   Patient presents with   • Abdominal Pain       Sri Kellogg is a  62 y.o. female here for a follow up visit for abdominal pain.    HPI  62-year-old female presents today for follow-up visit for upper abdominal pain.  She is a patient of Dr. Bentley.  She is new to me today.  She was last seen in the office by physician assistant Karen Browne on 3/2022.  She has a history of GERD and admits that she has been having severe upper abdominal pain that radiates to her back.  She tells me the pain is just getting worse.  She admits she used to be on Protonix 40 mg daily per Dr. Bentley.  But she quit taking it a long time ago because she did not think she needed it anymore.  She has been feeling so bad that she had a CT scan of the abdomen and pelvis done yesterday at [facility] that was positive for moderate stool burden otherwise negative.  She does admit she is also constipated.  She tells me she does not take anything regularly for it.  Her PCP wants her to start MiraLAX.  She has not started it yet.  She had an esophagram done on 4/2022 that was normal.  Her last EGD and colonoscopy was in July 2017.  She does have history of fatty liver disease and most recent LFTs done in January of last year were normal.  She is a type II diabetic and just recently stopped Ozempic due to unwanted side effects.  She was wondering if maybe some of the symptoms could be related to that?  She still having a lot of issues with daily nausea.  She is also reporting pain under her left arm in her armpit.  She does have a family history of colon polyps with her mother.  At last visit she was supposed to get an MRI of her abdomen and pelvis done due to elevated lipase levels and continued abdominal pain.  She admits she did not have that done. She does have a history of fibromyalgia and lupus.  Is a history of cholecystectomy.  She denies any dysphagia, vomiting, diarrhea, rectal bleeding or melena.  She admits her appetite  is okay and her weight is stable.  Past Medical History:   Diagnosis Date   • Anxiety and depression    • Cancer (HCC)     skin   • Colon polyp    • COVID-19    • Diabetes mellitus (HCC)    • Dry skin    • Fibromyalgia    • GERD (gastroesophageal reflux disease)    • History of lupus    • Hyperlipidemia    • Hypertension    • PVC's (premature ventricular contractions)    • SOB (shortness of breath)        Past Surgical History:   Procedure Laterality Date   • BREAST LUMPECTOMY     • CARDIAC CATHETERIZATION  01/01/2013    Normal coronary arteries   • CHOLECYSTECTOMY     • COLONOSCOPY  2014   • COLONOSCOPY N/A 7/11/2017    Procedure: COLONOSCOPY to cecum with random biopsies, and hot snare and cold polypectomies;  Surgeon: Saud Bentley MD;  Location: [facility] ENDOSCOPY;  Service: TA w/low grade dysplasia x 2   • ENDOSCOPY N/A 7/11/2017    Procedure: ESOPHAGOGASTRODUODENOSCOPY with biopsies;  Surgeon: Saud Bentley MD;  Location: [facility] ENDOSCOPY;  Service: LA Grade A esophagitis, erythematous mucosa in stomach, multiple gastric polyps, chronic gastritis, mucosal hyperplasia   • HYSTERECTOMY     • SINUS SURGERY     • UPPER GASTROINTESTINAL ENDOSCOPY  07/11/2017    LA Grade A reflux esophagitis, Erythematous mucosa in the stomach, Multiple gastric polyps       Scheduled Meds:    Continuous Infusions:No current facility-administered medications for this visit.      PRN Meds:.    Allergies   Allergen Reactions   • Codeine Palpitations, GI Intolerance and Shortness Of Breath   • Penicillins Arrhythmia and Shortness Of Breath     RAPID HEARTRATE   • Demerol [Meperidine] Hives   • Meperidine Hcl Hives   • Adhesive Tape Rash   • Dust Mite Extract Other (See Comments)     SNEEZING, SOA   • Levofloxacin Other (See Comments)     RIGID JAW   • Lisinopril Cough   • Petrolatum Rash       Social History     Socioeconomic History   • Marital status: Single   Tobacco Use   • Smoking status: Former     Packs/day: 2.00     Types:  Cigarettes   • Smokeless tobacco: Never   • Tobacco comments:     JIMMY 20 YRS AGO   Vaping Use   • Vaping Use: Never used   Substance and Sexual Activity   • Alcohol use: No   • Drug use: No   • Sexual activity: Defer       Family History   Problem Relation Age of Onset   • TRAE disease Mother    • Colon polyps Mother    • Malig Hyperthermia Neg Hx        Review of Systems   Constitutional: Negative for appetite change, chills, diaphoresis, fatigue, fever and unexpected weight change.   HENT: Negative for nosebleeds, postnasal drip, sore throat, trouble swallowing and voice change.    Respiratory: Negative for cough, choking, chest tightness, shortness of breath and wheezing.    Cardiovascular: Negative for chest pain.   Gastrointestinal: Positive for abdominal distention, abdominal pain, constipation and nausea. Negative for anal bleeding, blood in stool, diarrhea, rectal pain and vomiting.   Endocrine: Negative for polydipsia, polyphagia and polyuria.   Musculoskeletal: Positive for back pain. Negative for gait problem.   Skin: Negative for rash and wound.   Allergic/Immunologic: Negative for food allergies.   Neurological: Negative for dizziness, speech difficulty and light-headedness.   Psychiatric/Behavioral: Negative for confusion, self-injury, sleep disturbance and suicidal ideas.       Vitals:    02/17/23 1037   BP: 122/78   Pulse: 60   Temp: 97.3 °F (36.3 °C)   SpO2: 96%       Physical Exam  Constitutional:       General: She is not in acute distress.     Appearance: She is well-developed. She is not ill-appearing.   HENT:      Head: Normocephalic.   Eyes:      Pupils: Pupils are equal, round, and reactive to light.   Cardiovascular:      Rate and Rhythm: Normal rate and regular rhythm.      Heart sounds: Normal heart sounds.   Pulmonary:      Effort: Pulmonary effort is normal.      Breath sounds: Normal breath sounds.   Abdominal:      General: Bowel sounds are normal. There is distension.      Palpations:  Abdomen is soft. There is no mass.      Tenderness: There is abdominal tenderness. There is no guarding or rebound.      Hernia: No hernia is present.       Musculoskeletal:         General: Normal range of motion.   Skin:     General: Skin is warm and dry.   Neurological:      Mental Status: She is alert and oriented to person, place, and time.   Psychiatric:         Speech: Speech normal.         Behavior: Behavior normal.         Judgment: Judgment normal.         No radiology results for the last 7 days     Diagnoses and all orders for this visit:    1. Epigastric pain (Primary)  -     Case Request; Standing  -     Case Request    2. Gastroesophageal reflux disease, unspecified whether esophagitis present  -     Case Request; Standing  -     Case Request  -     CBC & Differential  -     Comprehensive Metabolic Panel  -     Lipase  -     pantoprazole (PROTONIX) 40 MG EC tablet; Take 1 tablet by mouth Daily.  Dispense: 30 tablet; Refill: 5    3. Constipation, unspecified constipation type  -     CBC & Differential  -     Comprehensive Metabolic Panel  -     Lipase    4. Fatty liver  -     CBC & Differential  -     Comprehensive Metabolic Panel  -     Lipase    5. Elevated lipase  -     CBC & Differential  -     Comprehensive Metabolic Panel  -     Lipase    6. FH: colon polyps  -     Case Request; Standing  -     Case Request    7. Encounter for screening for malignant neoplasm of colon  -     Case Request; Standing  -     Case Request    Other orders  -     Follow Anesthesia Guidelines / Protocol; Future  -     Obtain Informed Consent; Future  -     Obtain Informed Consent; Standing  -     Verify Bowel Prep Was Successful; Standing  -     Give Tap Water Enema If Bowel Prep Insufficient; Standing       Reviewed most recent labs and CT scan results with her today.  She is overdue for some repeat labs.  I will go ahead and repeat labs including a CMP and a lipase level.  She is due for screening colonoscopy this  year anyway.  I think given her history and current symptoms it makes sense to go ahead and get her on the schedule for an EGD and colonoscopy with Dr. Bentley for further evaluation.  Patient is agreeable to the scopes.  For now we will restart Protonix 40 mg once a day.  May need to increase to twice a day if that is not enough but we will wait and see how she is doing first.  Would like her to start daily MiraLAX.  Continue high-fiber diet.  Patient to call the office next week with an update.  Patient to follow-up with Dr. Bentley in March as planned.  Patient is agreeable to the plan.

## 2023-02-20 ENCOUNTER — TELEPHONE (OUTPATIENT)
Dept: GASTROENTEROLOGY | Facility: CLINIC | Age: 63
End: 2023-02-20
Payer: MEDICARE

## 2023-02-20 ENCOUNTER — TELEPHONE (OUTPATIENT)
Dept: GASTROENTEROLOGY | Facility: CLINIC | Age: 63
End: 2023-02-20

## 2023-02-20 NOTE — TELEPHONE ENCOUNTER
----- Message from BRIAN Hadley sent at 2/20/2023  8:52 AM EST -----  These call the patient and let her know her sugar was elevated at 128.  Lipase is still elevated at 114 but much better than it was a year ago at 289.  All other labs look good.

## 2023-02-20 NOTE — TELEPHONE ENCOUNTER
"Call to pt.  Advise per LIDIA Lipscomb note.  Verb understanding.     States \"so we're not going to do anything about the lipase?\".  Question to LIDIA Lipscomb.   "

## 2023-02-20 NOTE — PROGRESS NOTES
These call the patient and let her know her sugar was elevated at 128.  Lipase is still elevated at 114 but much better than it was a year ago at 289.  All other labs look good.

## 2023-02-20 NOTE — TELEPHONE ENCOUNTER
Caller: Sri Kellogg    Relationship: Self    Best call back number: [phone]    What is the best time to reach you: ANYTIME    Who are you requesting to speak with (clinical staff, provider,  specific staff member): CLINICAL STAFF         What was the call regarding: PT WANTS TO SEE IF WE HAVE ANY CANCELLATIONS BECAUSE SHE WANTS TO GET HER EGD SOONER. PLEASE CALL AND ADVISE

## 2023-02-21 NOTE — TELEPHONE ENCOUNTER
Kristen Lipscomb, BRIAN  to Me        1:10 PM  The lipase is a very nonspecific lab test.  It was slightly elevated but not as high as previous.  And with normal amylase we are not as concerned.  She did have a CT scan that was negative for any acute abdominal process and will be getting scopes. That's what the work-up is for an elevated lipase.  Thanks     **Call to pt.  Advise of above.  Verb understanding.  Jane Monroy RN.

## 2023-02-28 NOTE — TELEPHONE ENCOUNTER
Caller: Sri Kellogg    Relationship to patient: Self    Best call back number: [phone]    Patient is needing: PATIENT STILL NEEDING SOMETHING SOONER. PLEASE REACH OUT TO POSSIBLE SCHEDULE SOMETHING SOONER.

## 2023-06-12 ENCOUNTER — OFFICE VISIT (OUTPATIENT)
Dept: CARDIOLOGY | Facility: CLINIC | Age: 63
End: 2023-06-12
Payer: MEDICARE

## 2023-06-12 VITALS
SYSTOLIC BLOOD PRESSURE: 134 MMHG | BODY MASS INDEX: 47.81 KG/M2 | HEART RATE: 96 BPM | WEIGHT: 259.8 LBS | DIASTOLIC BLOOD PRESSURE: 82 MMHG | HEIGHT: 62 IN | OXYGEN SATURATION: 90 %

## 2023-06-12 DIAGNOSIS — R00.2 PALPITATIONS: Primary | ICD-10-CM

## 2023-06-12 PROCEDURE — 93000 ELECTROCARDIOGRAM COMPLETE: CPT | Performed by: INTERNAL MEDICINE

## 2023-06-12 PROCEDURE — 3079F DIAST BP 80-89 MM HG: CPT | Performed by: INTERNAL MEDICINE

## 2023-06-12 PROCEDURE — 99214 OFFICE O/P EST MOD 30 MIN: CPT | Performed by: INTERNAL MEDICINE

## 2023-06-12 PROCEDURE — 3075F SYST BP GE 130 - 139MM HG: CPT | Performed by: INTERNAL MEDICINE

## 2023-06-12 RX ORDER — DOXYCYCLINE HYCLATE 100 MG/1
1 CAPSULE ORAL EVERY 12 HOURS SCHEDULED
COMMUNITY
Start: 2023-03-29 | End: 2023-06-12

## 2023-06-12 RX ORDER — NYSTATIN 100000 U/G
OINTMENT TOPICAL
COMMUNITY
Start: 2023-05-03

## 2023-06-12 RX ORDER — ORAL SEMAGLUTIDE 7 MG/1
TABLET ORAL
COMMUNITY
Start: 2023-04-03 | End: 2023-06-12

## 2023-06-12 NOTE — PROGRESS NOTES
Subjective:     Encounter Date:06/12/2023      Patient ID: Sri Kellogg is a 63 y.o. female.    Chief Complaint: palpitations  HPI:   This is a 63  year old woman who I am meeting for the first time today. She has a history of palpitations associated with PACs and SVT. She also has fibromyalgia, anxiety and depression.   She recently was in [place] and abruptly turned her head, which prompted an episode of vertigo. She has some mild residual symptoms since then. In the past she has been treated for BPPV requiring Johann Halpike maneuver. She also has unilateral left sided intermittent pulsatile tinnitus without hearing loss. She has not had any head or neck imaging.     The following portions of the patient's history were reviewed and updated as appropriate: allergies, current medications, past family history, past medical history, past social history, past surgical history and problem list.     REVIEW OF SYSTEMS:   All systems reviewed.  Pertinent positives identified in HPI.  All other systems are negative.    Past Medical History:   Diagnosis Date    Anxiety and depression     Cancer     skin    Colon polyp     COVID-19     Diabetes mellitus     Dry skin     Fibromyalgia     GERD (gastroesophageal reflux disease)     History of lupus     Hyperlipidemia     Hypertension     PVC's (premature ventricular contractions)     SOB (shortness of breath)        Family History   Problem Relation Age of Onset    TRAE disease Mother     Colon polyps Mother     Malig Hyperthermia Neg Hx        Social History     Socioeconomic History    Marital status: Single    Number of children: 1   Tobacco Use    Smoking status: Former     Packs/day: 2.00     Types: Cigarettes    Smokeless tobacco: Never    Tobacco comments:     QIUT 20 YRS AGO   Vaping Use    Vaping Use: Never used   Substance and Sexual Activity    Alcohol use: No     Comment: no caffeine    Drug use: No    Sexual activity: Defer       Allergies   Allergen Reactions     Codeine Palpitations, GI Intolerance and Shortness Of Breath    Penicillins Arrhythmia and Shortness Of Breath     RAPID HEARTRATE    Demerol [Meperidine] Hives    Meperidine Hcl Hives    Adhesive Tape Rash    Dust Mite Extract Other (See Comments)     SNEEZING, SOA    Levofloxacin Other (See Comments)     RIGID JAW    Lisinopril Cough    Petrolatum Rash       Past Surgical History:   Procedure Laterality Date    BREAST LUMPECTOMY      CARDIAC CATHETERIZATION  01/01/2013    Normal coronary arteries    CHOLECYSTECTOMY      COLONOSCOPY  2014    COLONOSCOPY N/A 7/11/2017    Procedure: COLONOSCOPY to cecum with random biopsies, and hot snare and cold polypectomies;  Surgeon: Saud Bentley MD;  Location: [facility] ENDOSCOPY;  Service: TA w/low grade dysplasia x 2    ENDOSCOPY N/A 7/11/2017    Procedure: ESOPHAGOGASTRODUODENOSCOPY with biopsies;  Surgeon: Saud Bentley MD;  Location: [facility] ENDOSCOPY;  Service: LA Grade A esophagitis, erythematous mucosa in stomach, multiple gastric polyps, chronic gastritis, mucosal hyperplasia    HYSTERECTOMY      SINUS SURGERY      UPPER GASTROINTESTINAL ENDOSCOPY  07/11/2017    LA Grade A reflux esophagitis, Erythematous mucosa in the stomach, Multiple gastric polyps         ECG 12 Lead    Date/Time: 6/12/2023 4:06 PM  Performed by: Nava Ball MD  Authorized by: Nava Ball MD   Comparison: compared with previous ECG from 4/1/2019  Similar to previous ECG  Rhythm: sinus rhythm  Rate: normal  Conduction: conduction normal  ST Segments: ST segments normal  T Waves: T waves normal  QRS axis: normal  Other: no other findings    Clinical impression: normal ECG           Objective:         PHYSICAL EXAM:  GEN: VSS, no distress,   Eyes: normal sclera, normal lids and lashes  HENT: moist mucus membranes,   Respiratory: CTAB, no rales or wheezes  CV: RRR, no murmurs, , +2 DP and 2+ carotid pulses b/l  GI: NABS, soft,  Nontender, nondistended  MSK: no edema, no scoliosis or kyphosis  Skin:  no rash, warm, dry  Heme/Lymph: no bruising or bleeding  Psych: organized thought, normal behavior and affect  Neuro: Cranial nerves grossly intact, Alert and Oriented x 3.         Assessment:          Diagnosis Plan   1. Palpitations               Plan:       Palpitations: Associated with PACs and SVT  BPPV, will see ENT  Unilateral pulsatile tinnintus on the left, she will see ENT tomorrow and will ask them whether they think imaging is needed.     Dr. Small, thank you very much for referring this kind patient to me. Please call me with any questions or concerns. I will see the patient again in the office in 1 year.        Nava Ball MD  06/12/23  [facility]    Outpatient Encounter Medications as of 6/12/2023   Medication Sig Dispense Refill    Acetaminophen (TYLENOL 8 HOUR PO) Take  by mouth.      fluticasone (FLONASE) 50 MCG/ACT nasal spray 2 sprays into the nostril(s) as directed by provider Daily.      levothyroxine (SYNTHROID, LEVOTHROID) 50 MCG tablet Take 1 tablet by mouth Daily.      losartan (COZAAR) 25 MG tablet Take 1 tablet by mouth Every Night.      metoprolol succinate XL (TOPROL-XL) 25 MG 24 hr tablet Take 1 tablet by mouth Every Evening. Along with a 50 mg tablet in the am 90 tablet 1    metoprolol succinate XL (TOPROL-XL) 50 MG 24 hr tablet Take 1 tablet by mouth Every Morning. Along with a 25 mg tablet in the pm 90 tablet 1    nystatin (MYCOSTATIN) 553217 UNIT/GM ointment APPLY A GENEROUS AMOUNT TO AFFECTED AREAS FOUR TIMES DAILY      pantoprazole (PROTONIX) 40 MG EC tablet Take 1 tablet by mouth Daily. 30 tablet 5    sertraline (ZOLOFT) 25 MG tablet Take 1 tablet by mouth Daily.      vitamin D (ERGOCALCIFEROL) 50492 UNITS capsule capsule Take 1 capsule by mouth Every 7 (Seven) Days.      [DISCONTINUED] doxycycline (VIBRAMYCIN) 100 MG capsule Take 1 capsule by mouth Every 12 (Twelve) Hours.      [DISCONTINUED] naproxen (NAPROSYN) 500 MG tablet TAKE 1 TABLET BY MOUTH TWICE  DAILY FOR 10 DAYS AS NEEDED FOR PAIN      [DISCONTINUED] Rybelsus 7 MG tablet TAKE 1 TABLET BY MOUTH DAILY AFTER FINISHING 3MG       No facility-administered encounter medications on file as of 6/12/2023.

## 2023-06-12 NOTE — PROGRESS NOTES
Subjective:     Encounter Date:06/12/2023      Patient ID: Sri Kellogg is a 63 y.o. female.    Chief Complaint:  HPI:     The following portions of the patient's history were reviewed and updated as appropriate: allergies, current medications, past family history, past medical history, past social history, past surgical history and problem list.     REVIEW OF SYSTEMS:   All systems reviewed.  Pertinent positives identified in HPI.  All other systems are negative.    Past Medical History:   Diagnosis Date   • Anxiety and depression    • Cancer     skin   • Colon polyp    • COVID-19    • Diabetes mellitus    • Dry skin    • Fibromyalgia    • GERD (gastroesophageal reflux disease)    • History of lupus    • Hyperlipidemia    • Hypertension    • PVC's (premature ventricular contractions)    • SOB (shortness of breath)        Family History   Problem Relation Age of Onset   • TRAE disease Mother    • Colon polyps Mother    • Malig Hyperthermia Neg Hx        Social History     Socioeconomic History   • Marital status: Single   • Number of children: 1   Tobacco Use   • Smoking status: Former     Packs/day: 2.00     Types: Cigarettes   • Smokeless tobacco: Never   • Tobacco comments:     QIUT 20 YRS AGO   Vaping Use   • Vaping Use: Never used   Substance and Sexual Activity   • Alcohol use: No     Comment: no caffeine   • Drug use: No   • Sexual activity: Defer       Allergies   Allergen Reactions   • Codeine Palpitations, GI Intolerance and Shortness Of Breath   • Penicillins Arrhythmia and Shortness Of Breath     RAPID HEARTRATE   • Demerol [Meperidine] Hives   • Meperidine Hcl Hives   • Adhesive Tape Rash   • Dust Mite Extract Other (See Comments)     SNEEZING, SOA   • Levofloxacin Other (See Comments)     RIGID JAW   • Lisinopril Cough   • Petrolatum Rash       Past Surgical History:   Procedure Laterality Date   • BREAST LUMPECTOMY     • CARDIAC CATHETERIZATION  01/01/2013    Normal coronary arteries   •  CHOLECYSTECTOMY     • COLONOSCOPY  2014   • COLONOSCOPY N/A 7/11/2017    Procedure: COLONOSCOPY to cecum with random biopsies, and hot snare and cold polypectomies;  Surgeon: Saud Bentley MD;  Location:  [facility];  Service: TA w/low grade dysplasia x 2   • ENDOSCOPY N/A 7/11/2017    Procedure: ESOPHAGOGASTRODUODENOSCOPY with biopsies;  Surgeon: Saud Bentley MD;  Location:  [facility]OSCOPY;  Service: LA Grade A esophagitis, erythematous mucosa in stomach, multiple gastric polyps, chronic gastritis, mucosal hyperplasia   • HYSTERECTOMY     • SINUS SURGERY     • UPPER GASTROINTESTINAL ENDOSCOPY  07/11/2017    LA Grade A reflux esophagitis, Erythematous mucosa in the stomach, Multiple gastric polyps         ECG 12 Lead    Date/Time: 6/12/2023 2:47 PM  Performed by: Nava Ball MD  Authorized by: Nava Ball MD   Comparison: compared with previous ECG from 4/1/2019  Similar to previous ECG  Rhythm: sinus rhythm  Rate: normal  Conduction: conduction normal  ST Segments: ST segments normal  T Waves: T waves normal  QRS axis: normal  Other: no other findings    Clinical impression: normal ECG         Objective:         PHYSICAL EXAM:  GEN: VSS, no distress,   Eyes: normal sclera, normal lids and lashes  HENT: moist mucus membranes,   Respiratory: CTAB, no rales or wheezes  CV: RRR, no murmurs, , +2 DP and 2+ carotid pulses b/l  GI: NABS, soft,  Nontender, nondistended  MSK: no edema, no scoliosis or kyphosis  Skin: no rash, warm, dry  Heme/Lymph: no bruising or bleeding  Psych: organized thought, normal behavior and affect  Neuro: Cranial nerves grossly intact, Alert and Oriented x 3.         Assessment:         No diagnosis found.       Plan:           *** thank you very much for referring this kind patient to me. Please call me with any questions or concerns. I will see the patient again in the office in ***         Nava Ball MD  06/12/23  [facility]    Outpatient Encounter Medications as  of 6/12/2023   Medication Sig Dispense Refill   • Acetaminophen (TYLENOL 8 HOUR PO) Take  by mouth.     • fluticasone (FLONASE) 50 MCG/ACT nasal spray 2 sprays into the nostril(s) as directed by provider Daily.     • levothyroxine (SYNTHROID, LEVOTHROID) 50 MCG tablet Take 1 tablet by mouth Daily.     • losartan (COZAAR) 25 MG tablet Take 1 tablet by mouth Every Night.     • metoprolol succinate XL (TOPROL-XL) 25 MG 24 hr tablet Take 1 tablet by mouth Every Evening. Along with a 50 mg tablet in the am 90 tablet 1   • metoprolol succinate XL (TOPROL-XL) 50 MG 24 hr tablet Take 1 tablet by mouth Every Morning. Along with a 25 mg tablet in the pm 90 tablet 1   • nystatin (MYCOSTATIN) 401604 UNIT/GM ointment APPLY A GENEROUS AMOUNT TO AFFECTED AREAS FOUR TIMES DAILY     • pantoprazole (PROTONIX) 40 MG EC tablet Take 1 tablet by mouth Daily. 30 tablet 5   • sertraline (ZOLOFT) 25 MG tablet Take 1 tablet by mouth Daily.     • vitamin D (ERGOCALCIFEROL) 43767 UNITS capsule capsule Take 1 capsule by mouth Every 7 (Seven) Days.     • [DISCONTINUED] doxycycline (VIBRAMYCIN) 100 MG capsule Take 1 capsule by mouth Every 12 (Twelve) Hours.     • [DISCONTINUED] naproxen (NAPROSYN) 500 MG tablet TAKE 1 TABLET BY MOUTH TWICE DAILY FOR 10 DAYS AS NEEDED FOR PAIN     • [DISCONTINUED] Rybelsus 7 MG tablet TAKE 1 TABLET BY MOUTH DAILY AFTER FINISHING 3MG       No facility-administered encounter medications on file as of 6/12/2023.

## 2024-01-25 ENCOUNTER — TELEPHONE (OUTPATIENT)
Dept: GASTROENTEROLOGY | Facility: CLINIC | Age: 64
End: 2024-01-25
Payer: MEDICARE

## 2024-01-25 NOTE — TELEPHONE ENCOUNTER
CALENDAR IS OPEN FOR FOLLOW UP WITH PROVIDER MARKOS STATON NEEDS TO R/S APPT THAT WAS SET IN [place] FOR THE HUB TO READ AND MAKE

## 2024-06-13 ENCOUNTER — OFFICE VISIT (OUTPATIENT)
Dept: CARDIOLOGY | Facility: CLINIC | Age: 64
End: 2024-06-13
Payer: MEDICARE

## 2024-06-13 VITALS
HEART RATE: 57 BPM | DIASTOLIC BLOOD PRESSURE: 68 MMHG | OXYGEN SATURATION: 96 % | BODY MASS INDEX: 45.53 KG/M2 | WEIGHT: 247.4 LBS | HEIGHT: 62 IN | SYSTOLIC BLOOD PRESSURE: 142 MMHG

## 2024-06-13 DIAGNOSIS — I47.10 PSVT (PAROXYSMAL SUPRAVENTRICULAR TACHYCARDIA): ICD-10-CM

## 2024-06-13 DIAGNOSIS — I73.9 PVD (PERIPHERAL VASCULAR DISEASE) WITH CLAUDICATION: Primary | ICD-10-CM

## 2024-06-13 PROCEDURE — 99214 OFFICE O/P EST MOD 30 MIN: CPT | Performed by: NURSE PRACTITIONER

## 2024-06-13 PROCEDURE — 3077F SYST BP >= 140 MM HG: CPT | Performed by: NURSE PRACTITIONER

## 2024-06-13 PROCEDURE — 1160F RVW MEDS BY RX/DR IN RCRD: CPT | Performed by: NURSE PRACTITIONER

## 2024-06-13 PROCEDURE — 3078F DIAST BP <80 MM HG: CPT | Performed by: NURSE PRACTITIONER

## 2024-06-13 PROCEDURE — 1159F MED LIST DOCD IN RCRD: CPT | Performed by: NURSE PRACTITIONER

## 2024-06-13 PROCEDURE — 93000 ELECTROCARDIOGRAM COMPLETE: CPT | Performed by: NURSE PRACTITIONER

## 2024-06-13 RX ORDER — METOPROLOL SUCCINATE 50 MG/1
75 TABLET, EXTENDED RELEASE ORAL EVERY MORNING
Qty: 90 TABLET | Refills: 3 | Status: SHIPPED | OUTPATIENT
Start: 2024-06-13

## 2024-06-13 RX ORDER — TIZANIDINE 4 MG/1
4 TABLET ORAL
COMMUNITY
Start: 2024-03-19 | End: 2024-06-13

## 2024-06-13 RX ORDER — LOSARTAN POTASSIUM 50 MG/1
50 TABLET ORAL NIGHTLY
Qty: 90 TABLET | Refills: 3 | Status: SHIPPED | OUTPATIENT
Start: 2024-06-13

## 2024-06-13 RX ORDER — ESTRADIOL 0.1 MG/G
CREAM VAGINAL
COMMUNITY
Start: 2024-03-22

## 2024-06-13 RX ORDER — METOPROLOL SUCCINATE 25 MG/1
25 TABLET, EXTENDED RELEASE ORAL EVERY EVENING
Qty: 90 TABLET | Refills: 1 | Status: SHIPPED | OUTPATIENT
Start: 2024-06-13

## 2024-06-13 NOTE — PROGRESS NOTES
Date of Office Visit: 2024  Encounter Provider: BRIAN Fritz  Place of Service: [facility] CARDIOLOGY  Patient Name: Sri Kellogg  :1960    Chief Complaint   Patient presents with    Follow-up   : Palpitations    HPI: Sri Kellogg is a 64 y.o. female who is a patient of Dr. Ball and is new to me today.  She has a history of palpitations with PACs and SVT.  She also has a history of fibromyalgia, anxiety and depression.  She was last in the office in  of last year she had abruptly turned her head while at a gas station and had an episode of vertigo.  She has been treated for BPPV in the past with Anderson-Hallpike maneuver.  She also has unilateral left-sided intermittent pulsatile tinnitus without hearing loss.  She was going to see the ear nose and throat the following day we did not make any changes.    Her palpitations are stable.  She has been taking 75 mg of metoprolol every morning.  Blood pressures running a little higher today.  I rechecked it and it is in the 140s.  She does not really check it at home.  She has been watching her diet and has lost about 15 pounds.  She denies any chest pain, shortness of breath or loss of consciousness she does complain of some pain in her legs.  Legs hurt when she walks and sometimes get weak on her.  Previous testing and notes have been reviewed by me.   Past Medical History:   Diagnosis Date    Anxiety and depression     Cancer     SKIN    Colon polyp     COVID-19     Diabetes mellitus     Dry skin     Elevated cholesterol     Fibromyalgia     GERD (gastroesophageal reflux disease)     History of lupus     Hyperlipidemia     Hypertension     PVC's (premature ventricular contractions)     SOB (shortness of breath)        Past Surgical History:   Procedure Laterality Date    BREAST LUMPECTOMY      CARDIAC CATHETERIZATION  2013    Normal coronary arteries    CHOLECYSTECTOMY      COLONOSCOPY       COLONOSCOPY N/A 7/11/2017    Procedure: COLONOSCOPY to cecum with random biopsies, and hot snare and cold polypectomies;  Surgeon: Saud Bentley MD;  Location: [facility] ENDOSCOPY;  Service: TA w/low grade dysplasia x 2    COLONOSCOPY N/A 6/27/2023    Procedure: COLONOSCOPY into cecum with cold biopsy and cold snare polypectomies;  Surgeon: Saud Bentley MD;  Location: [facility] ENDOSCOPY;  Service: Gastroenterology;  Laterality: N/A;  Pre op: Constipation, Family history of polyps  Post op: Polyps and Internal Hemorrhoids    ENDOSCOPY N/A 7/11/2017    Procedure: ESOPHAGOGASTRODUODENOSCOPY with biopsies;  Surgeon: Saud Bentley MD;  Location: [facility] ENDOSCOPY;  Service: LA Grade A esophagitis, erythematous mucosa in stomach, multiple gastric polyps, chronic gastritis, mucosal hyperplasia    ENDOSCOPY N/A 6/27/2023    Procedure: ESOPHAGOGASTRODUODENOSCOPY with biopsies;  Surgeon: Saud Bentley MD;  Location: [facility] ENDOSCOPY;  Service: Gastroenterology;  Laterality: N/A;  Pre op: Dyspepsia  Post op: Gastric polyps and Gastritis    HYSTERECTOMY      SINUS SURGERY      UPPER GASTROINTESTINAL ENDOSCOPY  07/11/2017    LA Grade A reflux esophagitis, Erythematous mucosa in the stomach, Multiple gastric polyps       Social History     Socioeconomic History    Marital status: Single    Number of children: 1   Tobacco Use    Smoking status: Former     Current packs/day: 2.00     Types: Cigarettes    Smokeless tobacco: Never    Tobacco comments:     QUIT 20 YRS AGO   Vaping Use    Vaping status: Never Used   Substance and Sexual Activity    Alcohol use: No     Comment: no caffeine    Drug use: No    Sexual activity: Defer       Family History   Problem Relation Age of Onset    TRAE disease Mother     Colon polyps Mother     Malig Hyperthermia Neg Hx        Review of Systems   Constitutional: Negative for diaphoresis and malaise/fatigue.   Cardiovascular:  Negative for chest pain, claudication, dyspnea on exertion, irregular heartbeat,  leg swelling, near-syncope, orthopnea, palpitations, paroxysmal nocturnal dyspnea and syncope.   Respiratory:  Negative for cough, shortness of breath and sleep disturbances due to breathing.    Musculoskeletal:  Positive for muscle cramps. Negative for falls.   Neurological:  Negative for dizziness and weakness.   Psychiatric/Behavioral:  Negative for altered mental status and substance abuse.        Allergies   Allergen Reactions    Codeine Palpitations, GI Intolerance and Shortness Of Breath    Penicillins Arrhythmia and Shortness Of Breath     RAPID HEARTRATE    Demerol [Meperidine] Hives    Adhesive Tape Rash    Dust Mite Extract Other (See Comments)     SNEEZING, SOA    Levofloxacin Other (See Comments)     RIGID JAW    Lisinopril Cough    Petrolatum Rash         Current Outpatient Medications:     fluticasone (FLONASE) 50 MCG/ACT nasal spray, 2 sprays into the nostril(s) as directed by provider Daily., Disp: , Rfl:     levothyroxine (SYNTHROID, LEVOTHROID) 50 MCG tablet, Take 1 tablet by mouth Daily., Disp: , Rfl:     losartan (COZAAR) 25 MG tablet, Take 1 tablet by mouth Every Night., Disp: , Rfl:     metoprolol succinate XL (TOPROL-XL) 25 MG 24 hr tablet, Take 1 tablet by mouth Every Evening. Along with a 50 mg tablet in the am, Disp: 90 tablet, Rfl: 1    metoprolol succinate XL (TOPROL-XL) 50 MG 24 hr tablet, Take 1 tablet by mouth Every Morning. Along with a 25 mg tablet in the pm, Disp: 90 tablet, Rfl: 1    nystatin (MYCOSTATIN) 927403 UNIT/GM ointment, As Needed., Disp: , Rfl:     pantoprazole (PROTONIX) 40 MG EC tablet, Take 1 tablet by mouth Daily., Disp: 30 tablet, Rfl: 5    sertraline (ZOLOFT) 25 MG tablet, Take 1 tablet by mouth Daily., Disp: , Rfl:     SITagliptin (JANUVIA) 100 MG tablet, Take 1 tablet by mouth Every Night., Disp: , Rfl:     vitamin D (ERGOCALCIFEROL) 29979 UNITS capsule capsule, Take 1 capsule by mouth Every 7 (Seven) Days., Disp: , Rfl:       Objective:     Vitals:    06/13/24  "1307   Height: 157.5 cm (62\")     Body mass index is 48.03 kg/m².    PHYSICAL EXAM:    Constitutional:       General: Not in acute distress.     Appearance: Normal appearance. Well-developed.   Eyes:      Pupils: Pupils are equal, round, and reactive to light.   HENT:      Head: Normocephalic.   Neck:      Vascular: No carotid bruit or JVD.   Pulmonary:      Effort: Pulmonary effort is normal. No tachypnea.      Breath sounds: Normal breath sounds. No wheezing. No rales.   Cardiovascular:      Normal rate. Regular rhythm.      No gallop.    Pulses:     Intact distal pulses.   Edema:     Peripheral edema absent.   Abdominal:      General: Bowel sounds are normal.      Palpations: Abdomen is soft.      Tenderness: There is no abdominal tenderness.   Musculoskeletal: Normal range of motion.      Cervical back: Normal range of motion and neck supple. No edema. Skin:     General: Skin is warm and dry.   Neurological:      Mental Status: Alert and oriented to person, place, and time.         ECG 12 Lead    Date/Time: 6/13/2024 2:42 PM  Performed by: Ary Villarreal APRN    Authorized by: Ary Villarreal APRN  Comparison: compared with previous ECG from 6/12/2023  Similar to previous ECG  Rhythm: sinus rhythm  Rate: normal  QRS axis: normal    Clinical impression: normal ECG            Assessment:   1.  Palpitations-secondary to SVT and PACs in the past    2.  Essential hypertension will increase losartan to 50 mg a day    3.  Claudication-pain in legs with walking and leg weakness could be claudication symptoms.  Will order a arterial duplex.     Plan:       Follow-up in 3 months with Dr. Ball.         Your medication list            Accurate as of June 13, 2024  1:13 PM. If you have any questions, ask your nurse or doctor.                CONTINUE taking these medications        Instructions Last Dose Given Next Dose Due   fluticasone 50 MCG/ACT nasal spray  Commonly known as: FLONASE      2 sprays into the " nostril(s) as directed by provider Daily.       levothyroxine 50 MCG tablet  Commonly known as: SYNTHROID, LEVOTHROID      Take 1 tablet by mouth Daily.       losartan 25 MG tablet  Commonly known as: COZAAR      Take 1 tablet by mouth Every Night.       metoprolol succinate XL 25 MG 24 hr tablet  Commonly known as: TOPROL-XL      Take 1 tablet by mouth Every Evening. Along with a 50 mg tablet in the am       metoprolol succinate XL 50 MG 24 hr tablet  Commonly known as: TOPROL-XL      Take 1 tablet by mouth Every Morning. Along with a 25 mg tablet in the pm       nystatin 027626 UNIT/GM ointment  Commonly known as: MYCOSTATIN      As Needed.       pantoprazole 40 MG EC tablet  Commonly known as: PROTONIX      Take 1 tablet by mouth Daily.       sertraline 25 MG tablet  Commonly known as: ZOLOFT      Take 1 tablet by mouth Daily.       SITagliptin 100 MG tablet  Commonly known as: JANUVIA      Take 1 tablet by mouth Every Night.       vitamin D 1.25 MG (16972 UT) capsule capsule  Commonly known as: ERGOCALCIFEROL      Take 1 capsule by mouth Every 7 (Seven) Days.                  As always, it has been a pleasure to participate in your patient's care.      Sincerely,     Ary TORRES

## 2024-06-24 NOTE — PROGRESS NOTES
Subjective   Patient ID: Sri Kellogg is a 64 y.o. female is being seen for consultation today at the request of Claudio Small MD for L2-3 spinal stenosis right foraminal stenosis. Patient had a MRI L-spine done on 4/16/2024 @ [facility]    Treatment: No recent treatment     Today patient states that she has low back pain, along with B/L leg spasms, and weakness in B/L legs. Patient states that she has mild bladder incontinence     History of Present Illness    This patient has been having pain in her lower back with occasional spasms and weakness in her legs.  She says sometimes she gets shooting pains down her legs and they feel like they are going to give out on her but most of the time the pain is just in her back.  It is worse when she walks or does activities.  Nothing really makes it better.  She had an epidural block some years ago but it really bothers her a lot although it did help her back pain some but that she does not want to try any more of those.  She has some mild bladder incontinence as well.    The following portions of the patient's history were reviewed and updated as appropriate: allergies, current medications, past family history, past medical history, past social history, past surgical history, and problem list.    Review of Systems   Constitutional:  Negative for chills and fever.   HENT:  Negative for congestion.    Genitourinary:  Negative for difficulty urinating and dysuria.   Musculoskeletal:  Positive for back pain and myalgias.   Neurological:  Positive for weakness and numbness.       I reviewed the review of systems listed by the patient and discussed by my MA    Objective     There were no vitals filed for this visit.  There is no height or weight on file to calculate BMI.    Tobacco Use: Medium Risk (6/25/2024)    Patient History     Smoking Tobacco Use: Former     Smokeless Tobacco Use: Never     Passive Exposure: Not on file          Physical Exam  Constitutional:        Appearance: She is well-developed.   HENT:      Head: Normocephalic and atraumatic.   Eyes:      Extraocular Movements: EOM normal.      Conjunctiva/sclera: Conjunctivae normal.      Pupils: Pupils are equal, round, and reactive to light.   Neck:      Vascular: No carotid bruit.   Neurological:      Mental Status: She is oriented to person, place, and time.      Coordination: Finger-Nose-Finger Test and Heel to Shin Test normal.      Gait: Gait is intact.      Deep Tendon Reflexes:      Reflex Scores:       Tricep reflexes are 2+ on the right side and 2+ on the left side.       Bicep reflexes are 2+ on the right side and 2+ on the left side.       Brachioradialis reflexes are 2+ on the right side and 2+ on the left side.       Patellar reflexes are 2+ on the right side and 2+ on the left side.       Achilles reflexes are 2+ on the right side and 2+ on the left side.  Psychiatric:         Speech: Speech normal.       Neurologic Exam     Mental Status   Oriented to person, place, and time.   Registration of memory: Good recent and remote memory.   Attention: normal. Concentration: normal.   Speech: speech is normal   Level of consciousness: alert  Knowledge: consistent with education.     Cranial Nerves     CN II   Visual fields full to confrontation.   Visual acuity: normal    CN III, IV, VI   Pupils are equal, round, and reactive to light.  Extraocular motions are normal.     CN V   Facial sensation intact.   Right corneal reflex: normal  Left corneal reflex: normal    CN VII   Facial expression full, symmetric.   Right facial weakness: none  Left facial weakness: none    CN VIII   Hearing: intact    CN IX, X   Palate: symmetric    CN XI   Right sternocleidomastoid strength: normal  Left sternocleidomastoid strength: normal    CN XII   Tongue: not atrophic  Tongue deviation: none    Motor Exam   Muscle bulk: normal  Right arm tone: normal  Left arm tone: normal  Right leg tone: normal  Left leg tone:  normal    Strength   Strength 5/5 except as noted.     Sensory Exam   Light touch normal.     Gait, Coordination, and Reflexes     Gait  Gait: normal    Coordination   Finger to nose coordination: normal  Heel to shin coordination: normal    Reflexes   Right brachioradialis: 2+  Left brachioradialis: 2+  Right biceps: 2+  Left biceps: 2+  Right triceps: 2+  Left triceps: 2+  Right patellar: 2+  Left patellar: 2+  Right achilles: 2+  Left achilles: 2+  Right : 2+  Left : 2+          Assessment & Plan   Independent Review of Radiographic Studies:      I personally reviewed the images from the following studies.    I reviewed the report on her MRI which was done on 16 April at the [facility].  This does seem to show some moderate stenosis at L3-4 and L4-5 with a disc herniation on the left at L4-5.  The other levels mostly look okay.    Medical Decision Making:      I told the patient I do not think the narrowing is bad enough to warrant bladder incontinence.  I do think it is probably bad enough to warrant the back pain and the pain that she has in her legs.  She is morbidly obese and so she really needs to lose some weight before we could do much of anything with her back.  I did discuss a myelogram with her but she would prefer to hold off on that right now.  We will get her set up for some physical therapy.    Diagnoses and all orders for this visit:    1. Spinal stenosis of lumbar region with neurogenic claudication (Primary)  -     Ambulatory Referral to Physical Therapy      Return if symptoms worsen or fail to improve.

## 2024-06-25 ENCOUNTER — OFFICE VISIT (OUTPATIENT)
Dept: NEUROSURGERY | Facility: CLINIC | Age: 64
End: 2024-06-25
Payer: MEDICARE

## 2024-06-25 ENCOUNTER — PATIENT ROUNDING (BHMG ONLY) (OUTPATIENT)
Dept: NEUROSURGERY | Facility: CLINIC | Age: 64
End: 2024-06-25

## 2024-06-25 DIAGNOSIS — M48.062 SPINAL STENOSIS OF LUMBAR REGION WITH NEUROGENIC CLAUDICATION: Primary | ICD-10-CM

## 2024-06-25 PROCEDURE — 1159F MED LIST DOCD IN RCRD: CPT | Performed by: NEUROLOGICAL SURGERY

## 2024-06-25 PROCEDURE — 99203 OFFICE O/P NEW LOW 30 MIN: CPT | Performed by: NEUROLOGICAL SURGERY

## 2024-06-25 PROCEDURE — 1160F RVW MEDS BY RX/DR IN RCRD: CPT | Performed by: NEUROLOGICAL SURGERY

## 2024-08-21 ENCOUNTER — TELEPHONE (OUTPATIENT)
Dept: CARDIOLOGY | Facility: CLINIC | Age: 64
End: 2024-08-21
Payer: MEDICARE

## 2024-08-21 ENCOUNTER — HOSPITAL ENCOUNTER (OUTPATIENT)
Dept: CARDIOLOGY | Facility: HOSPITAL | Age: 64
Discharge: HOME | End: 2024-08-21
Admitting: NURSE PRACTITIONER
Payer: MEDICARE

## 2024-08-21 DIAGNOSIS — I73.9 PVD (PERIPHERAL VASCULAR DISEASE) WITH CLAUDICATION: ICD-10-CM

## 2024-08-21 LAB
BH CV LOWER ARTERIAL LEFT ABI RATIO: 1.05
BH CV LOWER ARTERIAL LEFT DORSALIS PEDIS SYS MAX: 135
BH CV LOWER ARTERIAL LEFT GREAT TOE SYS MAX: 117
BH CV LOWER ARTERIAL LEFT POST TIBIAL SYS MAX: 157
BH CV LOWER ARTERIAL LEFT TBI RATIO: 0.78
BH CV LOWER ARTERIAL RIGHT ABI RATIO: 1.02
BH CV LOWER ARTERIAL RIGHT DORSALIS PEDIS SYS MAX: 145
BH CV LOWER ARTERIAL RIGHT GREAT TOE SYS MAX: 110
BH CV LOWER ARTERIAL RIGHT POST TIBIAL SYS MAX: 153
BH CV LOWER ARTERIAL RIGHT TBI RATIO: 0.73
UPPER ARTERIAL LEFT ARM BRACHIAL SYS MAX: 150

## 2024-08-21 PROCEDURE — 93922 UPR/L XTREMITY ART 2 LEVELS: CPT

## 2024-08-21 NOTE — TELEPHONE ENCOUNTER
----- Message from Ary Villarreal sent at 8/21/2024  1:52 PM EDT -----  Let patient know his leg test shows normal circulation  ----- Message -----  From: Tono Rojas MD  Sent: 8/21/2024  10:42 AM EDT  To: Ary Villarreal APRN

## 2024-08-21 NOTE — TELEPHONE ENCOUNTER
Called and left VM, will continue to try to reach pt.    HUB- please put patient straight through to triage    Zora Contreras, RN  Triage RN  08/21/24 13:59 EDT

## 2024-08-22 NOTE — TELEPHONE ENCOUNTER
Pt called back in to triage.  Results reviewed with pt.  Instructed to call with any further questions or concerns.  Verbalized understanding.    Zora Contreras RN  Triage Nurse, [facility]  08/22/24 08:58 EDT

## 2024-08-22 NOTE — TELEPHONE ENCOUNTER
Called and left VM, will continue to try to reach pt.    HUB- please put patient straight through to triage    Zora Contreras, RN  Triage RN  08/22/24 08:53 EDT

## 2024-12-23 DIAGNOSIS — I47.10 PSVT (PAROXYSMAL SUPRAVENTRICULAR TACHYCARDIA): ICD-10-CM

## 2024-12-24 RX ORDER — METOPROLOL SUCCINATE 25 MG/1
TABLET, EXTENDED RELEASE ORAL
Qty: 90 TABLET | Refills: 1 | Status: SHIPPED | OUTPATIENT
Start: 2024-12-24

## 2024-12-24 NOTE — TELEPHONE ENCOUNTER
Protocol Failed.     I was able to call the patient and get her in with ADAM on Monday at the [facility] office     NOV 12/30/24 (BU)    LOV 6/13/24 (LAR)    Assessment:   1.  Palpitations-secondary to SVT and PACs in the past     2.  Essential hypertension will increase losartan to 50 mg a day     3.  Claudication-pain in legs with walking and leg weakness could be claudication symptoms.  Will order a arterial duplex.     Plan:       Follow-up in 3 months with Dr. Ball   [facility], [address] [place] [address] [place]  Phone: [phone]  Fax [phone]    Physical Therapy Daily Treatment Note  Date:  2019    Patient Name:  Rupal Hale    :  1961  MRN: [id number]  Restrictions/Precautions:    Medical/Treatment Diagnosis Information:  Diagnosis: R knee MMT s/p scope for PMM sx 3/8/19 S83.241D  Treatment Diagnosis: R knee pain C80.247   Insurance/Certification information:  PT Insurance Information: [id number]   Physician Information:  Referring Practitioner: Dr. Kimbrough Mon of care signed (Y/N): sent; POC expires 19     Date of Patient follow up with Physician: 4/10/19     G-Code (if applicable):      Date G-Code Applied:  3/13/19 LEFS 94%       Progress Note: []  Yes  [x]  No  Next due by: Visit #10       Latex Allergy:  [x]NO      []YES  Preferred Language for Healthcare:   [x]English       []other:    Visit # Insurance Allowable Requires auth    through 5/3/19     []no        [x]yes:[facility]       Pain level:  3/10 R knee     SUBJECTIVE: pt arrived 10 min late the patient reports just some nagging pain in knee. 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 He is working 5 hr days. OBJECTIVE: 6 weeks .   Observation:   Test measurements:     RESTRICTIONS/PRECAUTIONS: DOI 18     Exercises/Interventions:     Therapeutic Ex Sets/sec Reps Notes   Bike 5 min     Gastroc Stretch on Incline H30x3     HS Stool Walk 2 laps     Trustretch rocks and hold 10R/ 60\"     Monster @ Half Wall fwd & BWD GTB @ Ankles  2 laps     Plank H10x10     SAQ Mat's Edge 7# H5 2x10     Supine Psoas Release w/ knee flex stretch w/ strap H30x3     SL hip Abd w/ ext press into wall 2.5# 3x10   +hep   Supine IT Band Stretch w/ strap H30x3     Prone Quad Set w/ shin press into ball H10x10  +hep   Prone Quad stretch w/ strap H30x3    +hep   Supine QS with SLR  3\" 2x10   Hep    SB bridge with SLR H5 2x10 See ATC sheet  See note  Started 3/21/19                           Manual Intervention      STM/Rolling right IT Band, Quad, and Calf                                    NMR re-education      Lateral Cable Walk w/ Tap Matrix 20#  2x10     FSU Knee Tap/Posterior Lunge 6\" 3x10     SLS w/ forward wt tap to step stool 3# 2x10      LSD 4\" 3x10 W/ mirror    BOSU Laterals 2x10         Therapeutic Exercise and NMR EXR  [x] (42942) Provided verbal/tactile cueing for activities related to strengthening, flexibility, endurance, ROM for improvements in LE, proximal hip, and core control with self care, mobility, lifting, ambulation.  [] (51504) Provided verbal/tactile cueing for activities related to improving balance, coordination, kinesthetic sense, posture, motor skill, proprioception  to assist with LE, proximal hip, and core control in self care, mobility, lifting, ambulation and eccentric single leg control.      NMR and Therapeutic Activities:    [x] (12396 or 30099) Provided verbal/tactile cueing for activities related to improving balance, coordination, kinesthetic sense, posture, motor skill, proprioception and motor activation to allow for proper function of core, proximal hip and LE with self care and ADLs  [x] (13343) Gait Re-education- Provided training and instruction to the patient for proper LE, core and proximal hip recruitment and positioning and eccentric body weight control with ambulation re-education including up and down stairs     Home Exercise Program:    [x] (95517) Reviewed/Progressed HEP activities related to strengthening, flexibility, endurance, ROM of core, proximal hip and LE for functional self-care, mobility, lifting and ambulation/stair navigation   [] (23805)Reviewed/Progressed HEP activities related to improving balance, coordination, kinesthetic sense, posture, motor skill, proprioception of core, proximal hip and LE for self care, mobility, lifting, and ambulation/stair navigation

## 2025-06-16 DIAGNOSIS — I47.10 PSVT (PAROXYSMAL SUPRAVENTRICULAR TACHYCARDIA): ICD-10-CM

## 2025-06-17 DIAGNOSIS — I47.10 PSVT (PAROXYSMAL SUPRAVENTRICULAR TACHYCARDIA): ICD-10-CM

## 2025-06-17 RX ORDER — METOPROLOL SUCCINATE 50 MG/1
TABLET, EXTENDED RELEASE ORAL
Qty: 90 TABLET | Refills: 3 | Status: SHIPPED | OUTPATIENT
Start: 2025-06-17

## 2025-06-18 RX ORDER — METOPROLOL SUCCINATE 25 MG/1
TABLET, EXTENDED RELEASE ORAL
Qty: 90 TABLET | Refills: 1 | OUTPATIENT
Start: 2025-06-18

## 2025-08-28 RX ORDER — LOSARTAN POTASSIUM 50 MG/1
50 TABLET ORAL NIGHTLY
Qty: 90 TABLET | Refills: 3 | Status: SHIPPED | OUTPATIENT
Start: 2025-08-28

## (undated) DEVICE — TUBING, SUCTION, 1/4" X 10', STRAIGHT: Brand: MEDLINE

## (undated) DEVICE — THE SINGLE USE ETRAP – POLYP TRAP IS USED FOR SUCTION RETRIEVAL OF ENDOSCOPICALLY REMOVED POLYPS.: Brand: ETRAP

## (undated) DEVICE — SNAR POLYP SENSATION STDOVL 27 240 BX40

## (undated) DEVICE — CANNULA,ADULT,SOFT-TOUCH,7TUBE,SC: Brand: MEDLINE

## (undated) DEVICE — THE CARR-LOCKE INJECTION NEEDLE IS A SINGLE USE, DISPOSABLE, FLEXIBLE SHEATH INJECTION NEEDLE USED FOR THE INJECTION OF VARIOUS TYPES OF MEDIA THROUGH FLEXIBLE ENDOSCOPES.

## (undated) DEVICE — THE TORRENT IRRIGATION SCOPE CONNECTOR IS USED WITH THE TORRENT IRRIGATION TUBING TO PROVIDE IRRIGATION FLUIDS SUCH AS STERILE WATER DURING GASTROINTESTINAL ENDOSCOPIC PROCEDURES WHEN USED IN CONJUNCTION WITH AN IRRIGATION PUMP (OR ELECTROSURGICAL UNIT).: Brand: TORRENT

## (undated) DEVICE — SINGLE-USE BIOPSY FORCEPS: Brand: RADIAL JAW 4

## (undated) DEVICE — BITEBLOCK OMNI BLOC

## (undated) DEVICE — Device: Brand: DEFENDO AIR/WATER/SUCTION AND BIOPSY VALVE